# Patient Record
Sex: MALE | Race: WHITE | NOT HISPANIC OR LATINO | Employment: FULL TIME | URBAN - METROPOLITAN AREA
[De-identification: names, ages, dates, MRNs, and addresses within clinical notes are randomized per-mention and may not be internally consistent; named-entity substitution may affect disease eponyms.]

---

## 2019-06-14 ENCOUNTER — HOSPITAL ENCOUNTER (EMERGENCY)
Facility: MEDICAL CENTER | Age: 39
End: 2019-06-14
Attending: EMERGENCY MEDICINE
Payer: COMMERCIAL

## 2019-06-14 ENCOUNTER — APPOINTMENT (OUTPATIENT)
Dept: RADIOLOGY | Facility: MEDICAL CENTER | Age: 39
End: 2019-06-14
Attending: EMERGENCY MEDICINE
Payer: COMMERCIAL

## 2019-06-14 VITALS
RESPIRATION RATE: 16 BRPM | SYSTOLIC BLOOD PRESSURE: 118 MMHG | DIASTOLIC BLOOD PRESSURE: 76 MMHG | TEMPERATURE: 97.9 F | BODY MASS INDEX: 40.43 KG/M2 | HEIGHT: 74 IN | HEART RATE: 64 BPM | WEIGHT: 315 LBS | OXYGEN SATURATION: 98 %

## 2019-06-14 DIAGNOSIS — M54.32 SCIATICA OF LEFT SIDE: ICD-10-CM

## 2019-06-14 PROCEDURE — A9270 NON-COVERED ITEM OR SERVICE: HCPCS | Performed by: EMERGENCY MEDICINE

## 2019-06-14 PROCEDURE — 72100 X-RAY EXAM L-S SPINE 2/3 VWS: CPT

## 2019-06-14 PROCEDURE — 96372 THER/PROPH/DIAG INJ SC/IM: CPT

## 2019-06-14 PROCEDURE — 99284 EMERGENCY DEPT VISIT MOD MDM: CPT

## 2019-06-14 PROCEDURE — 700111 HCHG RX REV CODE 636 W/ 250 OVERRIDE (IP): Performed by: EMERGENCY MEDICINE

## 2019-06-14 PROCEDURE — 700102 HCHG RX REV CODE 250 W/ 637 OVERRIDE(OP): Performed by: EMERGENCY MEDICINE

## 2019-06-14 RX ORDER — METHYLPREDNISOLONE 4 MG/1
TABLET ORAL
Qty: 1 KIT | Refills: 0 | Status: SHIPPED | OUTPATIENT
Start: 2019-06-14 | End: 2020-10-27

## 2019-06-14 RX ORDER — CYCLOBENZAPRINE HCL 10 MG
10 TABLET ORAL 3 TIMES DAILY PRN
Qty: 30 TAB | Refills: 0 | Status: SHIPPED | OUTPATIENT
Start: 2019-06-14 | End: 2020-10-27

## 2019-06-14 RX ORDER — KETOROLAC TROMETHAMINE 30 MG/ML
30 INJECTION, SOLUTION INTRAMUSCULAR; INTRAVENOUS ONCE
Status: COMPLETED | OUTPATIENT
Start: 2019-06-14 | End: 2019-06-14

## 2019-06-14 RX ORDER — MORPHINE SULFATE 10 MG/ML
6 INJECTION, SOLUTION INTRAMUSCULAR; INTRAVENOUS ONCE
Status: COMPLETED | OUTPATIENT
Start: 2019-06-14 | End: 2019-06-14

## 2019-06-14 RX ORDER — HYDROCODONE BITARTRATE AND ACETAMINOPHEN 5; 325 MG/1; MG/1
1-2 TABLET ORAL EVERY 6 HOURS PRN
Qty: 20 TAB | Refills: 0 | Status: SHIPPED | OUTPATIENT
Start: 2019-06-14 | End: 2019-06-17

## 2019-06-14 RX ORDER — HYDROCODONE BITARTRATE AND ACETAMINOPHEN 5; 325 MG/1; MG/1
1 TABLET ORAL ONCE
Status: COMPLETED | OUTPATIENT
Start: 2019-06-14 | End: 2019-06-14

## 2019-06-14 RX ADMIN — HYDROCODONE BITARTRATE AND ACETAMINOPHEN 1 TABLET: 5; 325 TABLET ORAL at 10:20

## 2019-06-14 RX ADMIN — KETOROLAC TROMETHAMINE 30 MG: 30 INJECTION, SOLUTION INTRAMUSCULAR at 10:24

## 2019-06-14 RX ADMIN — MORPHINE SULFATE 6 MG: 10 INJECTION INTRAVENOUS at 11:01

## 2019-06-14 NOTE — ED TRIAGE NOTES
Chief Complaint   Patient presents with   • Low Back Pain     radiates down L leg, denies trauma     Ambulatory with discomfort

## 2019-06-14 NOTE — ED PROVIDER NOTES
ED Provider Note      CHIEF COMPLAINT  Chief Complaint   Patient presents with   • Low Back Pain     radiates down L leg, denies trauma       HPI  Ace Mcneill is a 39 y.o. male who presents with back pain.  Patient had some pain, but then was lifting some richy materials about 3 weeks ago.  Mild discomfort since then, but it was much worse over the last 3 days.  Sharp pain left low back.  Radiates down the back of the left thigh.  Worse with movement and cannot find a comfortable position.  Has not had fever chills injection drug use weakness numbness in the extremities.  No bowel or bladder dysfunction or saddle anesthesia.  Denies abdominal pain or urinary symptoms.  No rash    REVIEW OF SYSTEMS  Denies any weakness in the lower extremities. No bowel or bladder incontinence or saddle anesthesia. No fevers or chills. No injection drug use or IV drug abuse. No abdominal pain, nausea or vomiting. No dysuria, hematuria or flank pain.    PAST MEDICAL HISTORY  Past Medical History:   Diagnosis Date   • Gout    • Hypertension    • Renal disorder 2001    donated left kidney; one remaining   • Single kidney        FAMILY HISTORY  No family history on file.    SOCIAL HISTORY  Social History   Substance Use Topics   • Smoking status: Current Every Day Smoker   • Smokeless tobacco: Not on file      Comment: chews   • Alcohol use No       SURGICAL HISTORY  Past Surgical History:   Procedure Laterality Date   • FOOT ORIF  8/4/2009    Performed by NHUNG CHAVEZ at SURGERY [facility] ORS   • CRANIOPLASTY  1999   • PB FUSION OF KNEE  1996    right    • INGUINAL HERNIA REPAIR CHILD  1981    left        CURRENT MEDICATIONS  Home Medications     Reviewed by Willis Gay R.N. (Registered Nurse) on 06/14/19 at 0931  Med List Status: <None>   Medication Last Dose Status   ALLOPURINOL 100 MG PO TABS  Active   BusPIRone HCl (BUSPAR PO)  Active   hydrocodone-acetaminophen (NORCO) 5-325 MG Tab per tablet  Active  "  hydrocodone-acetaminophen (VICODIN) 5-500 MG TABS  Active   HYDROCODONE/APAP   MG TABS  Active   LISINOPRIL 10 MG PO TABS  Active   OXYCONTIN 20 MG TB12  Active   PRILOSEC 20 MG CPDR  Active   valsartan-hydrochlorothiazide (DIOVAN HCT) 160-12.5 MG per tablet  Active                ALLERGIES  Allergies   Allergen Reactions   • Nkda [No Known Drug Allergy]          PHYSICAL EXAM  VITAL SIGNS: BP (!) 168/105   Pulse 88   Temp 36.6 °C (97.9 °F) (Temporal)   Resp 20   Ht 1.88 m (6' 2\")   Wt (!) 143.7 kg (316 lb 12.8 oz)   SpO2 98%   BMI 40.67 kg/m²   Constitutional: Well developed, Well nourished, No acute distress, Non-toxic appearance. Complaining of pain  Neck: Grossly normal range of motion  Cardiovascular: Normal heart rate   Thorax & Lungs: No respiratory distress  Abdomen: Bowel sounds normal, soft, non-distended, nontender, no masses.  Skin: Warm, Dry, No rash.   Back: Diffuse left lower lumbar and sacral tenderness, no step-off or deformity  Extremities: No clubbing, cyanosis, edema, no Homans or cords   Neurologic: Grossly normal cranial nerves, 5 out of 5 EHL, FHL, gastrocnemius, tibialis anterior. 2+ DTRs at the patellas bilaterally. Normal gait . Normal sensory throughout.      RADIOLOGY/PROCEDURES  DX-LUMBAR SPINE-2 OR 3 VIEWS   Final Result      1.  There is no acute fracture or malalignment of the lumbar spine.   2.  There is L5-S1 degenerative disc space narrowing with endplate spurring.         imaging is interpreted by radiologist     COURSE & MEDICAL DECISION MAKING    Patient presents with lumbar back pain and radiculopathy.  Treated symptomatically with Toradol, morphine, Norco.  Had improvement of symptoms.  I obtained an x-ray because of the duration of his symptoms.  This was notable as above.  At this point patient will be treated with Medrol Dosepak, Norco, Flexeril.  Discussed expected course.  I would like the patient to follow-up with his doctor next week.  I " precautioned him to return the ER for weakness in extremities, bowel or bladder dysfunction, uncontrolled symptoms or concern.    FINAL IMPRESSION  1.  Left-sided lumbar radiculopathy        This dictation was created using voice recognition software. The accuracy of the dictation is limited to the abilities of the software.  The nursing notes were reviewed and certain aspects of this information were incorporated into this note.    Electronically signed by: Martin Ferreira, 6/14/2019 11:19 AM

## 2019-06-14 NOTE — ED NOTES
Pt verbalized understanding of D/C instructions, pt PWD, VSS, NAD, pt left walking with steady gait, home with girlfriend

## 2019-09-13 ENCOUNTER — APPOINTMENT (OUTPATIENT)
Dept: RADIOLOGY | Facility: MEDICAL CENTER | Age: 39
End: 2019-09-13
Attending: FAMILY MEDICINE
Payer: COMMERCIAL

## 2019-09-13 DIAGNOSIS — M54.32 LEFT SIDED SCIATICA: ICD-10-CM

## 2019-09-13 PROCEDURE — 72148 MRI LUMBAR SPINE W/O DYE: CPT

## 2019-12-18 ENCOUNTER — OFFICE VISIT (OUTPATIENT)
Dept: URGENT CARE | Facility: PHYSICIAN GROUP | Age: 39
End: 2019-12-18
Payer: COMMERCIAL

## 2019-12-18 VITALS
RESPIRATION RATE: 18 BRPM | HEART RATE: 86 BPM | OXYGEN SATURATION: 97 % | HEIGHT: 74 IN | TEMPERATURE: 98.7 F | WEIGHT: 315 LBS | DIASTOLIC BLOOD PRESSURE: 110 MMHG | SYSTOLIC BLOOD PRESSURE: 116 MMHG | BODY MASS INDEX: 40.43 KG/M2

## 2019-12-18 DIAGNOSIS — S60.459A FOREIGN BODY IN SKIN OF FINGER, INITIAL ENCOUNTER: ICD-10-CM

## 2019-12-18 PROCEDURE — 99213 OFFICE O/P EST LOW 20 MIN: CPT | Performed by: NURSE PRACTITIONER

## 2019-12-18 ASSESSMENT — ENCOUNTER SYMPTOMS
FOCAL WEAKNESS: 0
CHILLS: 0
WEAKNESS: 0
FEVER: 0

## 2019-12-18 NOTE — PROGRESS NOTES
Subjective:      Ace Mcneill is a 39 y.o. male who presents with Finger Injury (lodged hay in pinky nail)            Foreign Body in Skin   This is a new problem. The current episode started today. The problem occurs constantly. The problem has been unchanged. Pertinent negatives include no chills, fever, rash or weakness. Associated symptoms comments: Patient reports feeding his horses today and got a piece of hay stuck under his left fifth fingernail.  Cannot get it worked out himself. Nothing aggravates the symptoms. He has tried nothing for the symptoms.       Review of Systems   Constitutional: Negative for chills and fever.   Skin: Negative for itching and rash.   Neurological: Negative for focal weakness and weakness.     Past Medical History:   Diagnosis Date   • Gout    • Hypertension    • Renal disorder 2001    donated left kidney; one remaining   • Single kidney       Past Surgical History:   Procedure Laterality Date   • FOOT ORIF  8/4/2009    Performed by NHUNG CHAVEZ at SURGERY [facility] ORS   • CRANIOPLASTY  1999   • PB FUSION OF KNEE  1996    right    • INGUINAL HERNIA REPAIR CHILD  1981    left       Social History     Socioeconomic History   • Marital status:      Spouse name: Not on file   • Number of children: Not on file   • Years of education: Not on file   • Highest education level: Not on file   Occupational History   • Not on file   Social Needs   • Financial resource strain: Not on file   • Food insecurity:     Worry: Not on file     Inability: Not on file   • Transportation needs:     Medical: Not on file     Non-medical: Not on file   Tobacco Use   • Smoking status: Current Every Day Smoker     Packs/day: 0.00   • Smokeless tobacco: Never Used   • Tobacco comment: chews   Substance and Sexual Activity   • Alcohol use: No   • Drug use: No   • Sexual activity: Not on file   Lifestyle   • Physical activity:     Days per week: Not on file     Minutes per session: Not  "on file   • Stress: Not on file   Relationships   • Social connections:     Talks on phone: Not on file     Gets together: Not on file     Attends [employer] service: Not on file     Active member of club or organization: Not on file     Attends meetings of clubs or organizations: Not on file     Relationship status: Not on file   • Intimate partner violence:     Fear of current or ex partner: Not on file     Emotionally abused: Not on file     Physically abused: Not on file     Forced sexual activity: Not on file   Other Topics Concern   • Not on file   Social History Narrative   • Not on file    Nkda [no known drug allergy]         Objective:     /110 (BP Location: Right arm, Patient Position: Sitting, BP Cuff Size: Adult)   Pulse 86   Temp 37.1 °C (98.7 °F) (Temporal)   Resp 18   Ht 1.88 m (6' 2\")   Wt (!) 142.9 kg (315 lb)   SpO2 97%   BMI 40.44 kg/m²      Physical Exam  Vitals signs reviewed.   Constitutional:       Appearance: Normal appearance.   Skin:     General: Skin is warm.      Capillary Refill: Capillary refill takes less than 2 seconds.      Comments: Patient has FB under left 5th fingernail 1/3 way down   Neurological:      Mental Status: He is alert and oriented to person, place, and time.   Psychiatric:         Mood and Affect: Mood normal.         Behavior: Behavior normal.         Thought Content: Thought content normal.         Judgment: Judgment normal.                 Assessment/Plan:       1. Foreign body in skin of finger, initial encounter     Procedure: Foreign body removal  -Risks including bleeding, nerve damage, infection, and poor cosmetic outcome discussed. Benefits and alternatives discussed.   -Clean technique with sterile instruments used  -Local anesthesia, offered, patient refused  -FB removed after partial fingernail cut away, foreign body removed (piece of hay)  -Polysporin and dressing placed   -Patient tolerated well      Signs and symptoms of infection discussed " with patient will return to clinic if any and all are presenyt for further evaluation    Supportive care, differential diagnoses, and indications for immediate follow-up discussed with patient.    Pathogenesis of diagnosis discussed including typical length and natural progression. Patient expresses understanding and agrees to plan.       Please note that this dictation was created using voice recognition software. I have made every reasonable attempt to correct obvious errors, but I expect that there are errors of grammar and possibly content that I did not discover before finalizing the note.

## 2020-09-18 ENCOUNTER — OFFICE VISIT (OUTPATIENT)
Dept: DERMATOLOGY | Facility: IMAGING CENTER | Age: 40
End: 2020-09-18
Payer: COMMERCIAL

## 2020-09-18 VITALS — TEMPERATURE: 97.2 F | HEIGHT: 74 IN | BODY MASS INDEX: 38.5 KG/M2 | WEIGHT: 300 LBS

## 2020-09-18 DIAGNOSIS — D48.5 NEOPLASM OF UNCERTAIN BEHAVIOR OF SKIN: ICD-10-CM

## 2020-09-18 PROCEDURE — 11103 TANGNTL BX SKIN EA SEP/ADDL: CPT | Performed by: NURSE PRACTITIONER

## 2020-09-18 PROCEDURE — 11102 TANGNTL BX SKIN SINGLE LES: CPT | Performed by: NURSE PRACTITIONER

## 2020-09-18 ASSESSMENT — ENCOUNTER SYMPTOMS
DIAPHORESIS: 0
WEIGHT LOSS: 0
CHILLS: 0
FEVER: 0

## 2020-09-18 NOTE — PROGRESS NOTES
CC: Skin lesion    Subjective: New patient here for lesion on ear.  Here for lesion on ear that won't go away.     HPI/location: right ear lesion  Time present: 1 year  Painful lesion: Yes, tender to touch  Itching lesion: No  Enlarging lesion: No  Anything make it better or worse? Touching it    Possible skin tags, has 3 in right arm pit.  One on left chest    History of skin cancer: No  History of precancers/actinic keratoses: No  History of biopsies:No  History of blistering/severe sunburns:Yes, Details: as a teen  Family history of skin cancer:No  Family history of atypical moles:No    ROS: no fevers/chills. No itch.  No joint pain  DermPMH: no skin cancer/melanoma  No problem-specific Assessment & Plan notes found for this encounter.    Relevant PMH:  Social:    PE: Gen:SORINWJONY male in NAD.       A/P:    I have reviewed medications relevant to my specialty.

## 2020-09-18 NOTE — PROGRESS NOTES
Dermatology New Patient Visit    Chief Complaint   Patient presents with   • Skin Lesion       Subjective:     HPI:   Ace Mcneill is a 40 y.o. male presenting for    Subjective: New patient here for lesion on ear and irritated skin tags      HPI/location: right ear lesion  Time present: 1 year  Painful lesion: Yes, tender to touch  Itching lesion: No  Enlarging lesion: No  Anything make it better or worse? Touching it    Possible skin tags, has 3 in right arm pit and one on left chest    Large skin tag in arm pit gets caught on clothing and get pulled-tender  Large skin tag on chest gets rubbed by bullet proof vest (pt is ) and is tender at times    History of skin cancer: No  History of precancers/actinic keratoses: No  History of biopsies:No  History of blistering/severe sunburns:Yes, Details: as a teen  Family history of skin cancer:No  Family history of atypical moles:No        Past Medical History:   Diagnosis Date   • Gout    • Hyperlipidemia    • Hypertension    • Renal disorder 2001    donated left kidney; one remaining   • Single kidney        Current Outpatient Medications on File Prior to Visit   Medication Sig Dispense Refill   • valsartan-hydrochlorothiazide (DIOVAN HCT) 160-12.5 MG per tablet Take 1 Tab by mouth every day.     • ALLOPURINOL 100 MG PO TABS Take 100 mg by mouth every day.     • PRILOSEC 20 MG CPDR Take  by mouth every day.     • cyclobenzaprine (FLEXERIL) 10 MG Tab Take 1 Tab by mouth 3 times a day as needed. (Patient not taking: Reported on 12/18/2019) 30 Tab 0   • methylPREDNISolone (MEDROL DOSEPAK) 4 MG Tablet Therapy Pack Take as directed (Patient not taking: Reported on 12/18/2019) 1 Kit 0   • BusPIRone HCl (BUSPAR PO) Take  by mouth.     • OXYCONTIN 20 MG TB12 Take  by mouth as needed for Mild Pain.     • LISINOPRIL 10 MG PO TABS Take 10 mg by mouth every day.       No current facility-administered medications on file prior to visit.        Allergies      Allergen Reactions   • Nkda [No Known Drug Allergy]        History reviewed. No pertinent family history.    Social History     Socioeconomic History   • Marital status:      Spouse name: Not on file   • Number of children: Not on file   • Years of education: Not on file   • Highest education level: Not on file   Occupational History   • Not on file   Social Needs   • Financial resource strain: Not on file   • Food insecurity     Worry: Not on file     Inability: Not on file   • Transportation needs     Medical: Not on file     Non-medical: Not on file   Tobacco Use   • Smoking status: Current Every Day Smoker     Packs/day: 0.00   • Smokeless tobacco: Never Used   • Tobacco comment: chews   Substance and Sexual Activity   • Alcohol use: No   • Drug use: No   • Sexual activity: Not on file   Lifestyle   • Physical activity     Days per week: Not on file     Minutes per session: Not on file   • Stress: Not on file   Relationships   • Social connections     Talks on phone: Not on file     Gets together: Not on file     Attends Sabianist service: Not on file     Active member of club or organization: Not on file     Attends meetings of clubs or organizations: Not on file     Relationship status: Not on file   • Intimate partner violence     Fear of current or ex partner: Not on file     Emotionally abused: Not on file     Physically abused: Not on file     Forced sexual activity: Not on file   Other Topics Concern   • Not on file   Social History Narrative   • Not on file       Review of Systems   Constitutional: Negative for chills, diaphoresis, fever, malaise/fatigue and weight loss.   Skin: Negative for itching and rash.        Objective:     A focused cutaneous exam was completed including: ears and face above mask, chest and right axilla with the following pertinent findings listed below. Remaining above-listed examined areas within normal limits / negative for rashes or lesions.      Temp 36.2 °C (97.2  "°F)   Ht 1.88 m (6' 2\")   Wt (!) 136.1 kg (300 lb)     Physical Exam   Constitutional: He is oriented to person, place, and time and well-developed, well-nourished, and in no distress. No distress.   HENT:   Head: Normocephalic.       Pulmonary/Chest: Effort normal.   Neurological: He is alert and oriented to person, place, and time.   Skin: Skin is warm and dry. No rash noted. No erythema.        Psychiatric: Mood normal.       DATA: none applicable to review    Assessment and Plan:     1. Neoplasm of uncertain behavior of skinx3  Procedure Note   Procedure: Biopsy by shave technique  Location: right ear helix  Size: as noted in exam  Preoperative diagnosis:Ak, BCC, SCC  Risks, benefits and alternatives of procedure discussed and written informed consent obtained for procedure and verbal consent for photos. Time out completed. Area of biopsy prepped with alcohol. Anesthesia with 1% lidocaine with epinephrine administered with 30 gauge needle. Shave biopsy of the site performed. Hemostasis achieved with pressure and aluminum chloride. Vaseline applied to wound with bandage. Patient tolerated procedure well and there were no complications. The specimen was sent to the pathology lab by the staff. Wound care was discussed.    Procedure Note   Procedure: Biopsy by shave technique  Location: right axilla  Size: as noted in exam  Preoperative diagnosis:acrochordon?  Risks, benefits and alternatives of procedure discussed and written informed consent obtained for procedure and verbal consent for photos. Time out completed. Area of biopsy prepped with alcohol. Anesthesia with 1% lidocaine with epinephrine administered with 30 gauge needle. Shave biopsy of the site performed. Hemostasis achieved with pressure and aluminum chloride. Vaseline applied to wound with bandage. Patient tolerated procedure well and there were no complications. The specimen was sent to the pathology lab by the staff. Wound care was " discussed.    Procedure Note   Procedure: Biopsy by shave technique  Location: left upper chest  Size: as noted in exam  Preoperative diagnosis:achrocordon?  Risks, benefits and alternatives of procedure discussed and written informed consent obtained for procedure and verbal consent for photos. Time out completed. Area of biopsy prepped with alcohol. Anesthesia with 1% lidocaine with epinephrine administered with 30 gauge needle. Shave biopsy of the site performed. Hemostasis achieved with pressure and aluminum chloride. Vaseline applied to wound with bandage. Patient tolerated procedure well and there were no complications. The specimen was sent to the pathology lab by the staff. Wound care was discussed.        Followup: Return for pending biopsy results.    VANDA Sigala.

## 2020-09-24 ENCOUNTER — TELEPHONE (OUTPATIENT)
Dept: DERMATOLOGY | Facility: IMAGING CENTER | Age: 40
End: 2020-09-24

## 2020-10-27 ENCOUNTER — OFFICE VISIT (OUTPATIENT)
Dept: URGENT CARE | Facility: PHYSICIAN GROUP | Age: 40
End: 2020-10-27
Payer: COMMERCIAL

## 2020-10-27 ENCOUNTER — HOSPITAL ENCOUNTER (OUTPATIENT)
Facility: MEDICAL CENTER | Age: 40
End: 2020-10-27
Attending: FAMILY MEDICINE
Payer: COMMERCIAL

## 2020-10-27 VITALS
DIASTOLIC BLOOD PRESSURE: 82 MMHG | OXYGEN SATURATION: 96 % | WEIGHT: 315 LBS | HEART RATE: 109 BPM | TEMPERATURE: 97.4 F | HEIGHT: 74 IN | BODY MASS INDEX: 40.43 KG/M2 | SYSTOLIC BLOOD PRESSURE: 142 MMHG | RESPIRATION RATE: 18 BRPM

## 2020-10-27 DIAGNOSIS — R68.83 CHILLS: ICD-10-CM

## 2020-10-27 DIAGNOSIS — Z20.822 EXPOSURE TO COVID-19 VIRUS: ICD-10-CM

## 2020-10-27 DIAGNOSIS — M25.471 PAIN AND SWELLING OF RIGHT ANKLE: ICD-10-CM

## 2020-10-27 DIAGNOSIS — M25.571 PAIN AND SWELLING OF RIGHT ANKLE: ICD-10-CM

## 2020-10-27 LAB — COVID ORDER STATUS COVID19: NORMAL

## 2020-10-27 PROCEDURE — U0003 INFECTIOUS AGENT DETECTION BY NUCLEIC ACID (DNA OR RNA); SEVERE ACUTE RESPIRATORY SYNDROME CORONAVIRUS 2 (SARS-COV-2) (CORONAVIRUS DISEASE [COVID-19]), AMPLIFIED PROBE TECHNIQUE, MAKING USE OF HIGH THROUGHPUT TECHNOLOGIES AS DESCRIBED BY CMS-2020-01-R: HCPCS

## 2020-10-27 PROCEDURE — 99214 OFFICE O/P EST MOD 30 MIN: CPT | Mod: CS | Performed by: FAMILY MEDICINE

## 2020-10-27 RX ORDER — METHYLPREDNISOLONE 4 MG/1
TABLET ORAL
Qty: 21 TAB | Refills: 0 | Status: SHIPPED | OUTPATIENT
Start: 2020-10-27 | End: 2020-11-02

## 2020-10-27 NOTE — PROGRESS NOTES
Chief Complaint:    Chief Complaint   Patient presents with   • Ankle Pain     bilat ankle pain and swelling-no known cause of ankle pain   • Chills       History of Present Illness:    This is a new problem. 3 days ago, he started with some left ankle pain and swelling for no particular reason which has now resolved, but next day, his right ankle started with pain and swelling for no particular reason. He has history of gout and takes Allopurinol but has not had gout attack in many years. He takes Allopurinol. He has been doing some moving and has been on feet a lot recently. 2 days ago, he started with some chills, but no definite fever. No URI type of symptoms. Has one kidney and Creatinine is checked yearly, usually is 1.6 range, so he tries to avoid NSAIDs. Medrol Dose Sánchez works well and is tolerated.      Review of Systems:    Constitutional: See HPI.   Eyes: Negative for change in vision, photophobia, pain, redness, and discharge.  ENT: Negative for ear pain, ear discharge, hearing loss, tinnitus, nasal congestion, nosebleeds, and sore throat.    Respiratory: Negative for cough, hemoptysis, sputum production, shortness of breath, wheezing, and stridor.    Cardiovascular: Negative for chest pain, palpitations, orthopnea, claudication, leg swelling, and PND.   Gastrointestinal: Negative for abdominal pain, nausea, vomiting, diarrhea, constipation, blood in stool, and melena.   Genitourinary: Negative for dysuria, urinary urgency, urinary frequency, hematuria, and flank pain.   Musculoskeletal: See HPI.  Skin: Negative for rash and itching.   Neurological: Negative for dizziness, tingling, tremors, sensory change, speech change, focal weakness, seizures, loss of consciousness, and headaches.   Endo: Negative for polydipsia.   Heme: Does not bruise/bleed easily.   Psychiatric/Behavioral: Negative for depression, suicidal ideas, hallucinations, memory loss and substance abuse. The patient is not nervous/anxious  and does not have insomnia.        Past Medical History:    Past Medical History:   Diagnosis Date   • Gout    • Hyperlipidemia    • Hypertension    • Renal disorder 2001    donated left kidney; one remaining   • Single kidney      Past Surgical History:    Past Surgical History:   Procedure Laterality Date   • FOOT ORIF  8/4/2009    Performed by NHUNG CHAVEZ at SURGERY [facility] ORS   • CRANIOPLASTY  1999   • PB FUSION OF KNEE  1996    right    • INGUINAL HERNIA REPAIR CHILD  1981    left      Social History:    Social History     Socioeconomic History   • Marital status:      Spouse name: Not on file   • Number of children: Not on file   • Years of education: Not on file   • Highest education level: Not on file   Occupational History   • Not on file   Social Needs   • Financial resource strain: Not on file   • Food insecurity     Worry: Not on file     Inability: Not on file   • Transportation needs     Medical: Not on file     Non-medical: Not on file   Tobacco Use   • Smoking status: Current Every Day Smoker     Packs/day: 0.00   • Smokeless tobacco: Never Used   • Tobacco comment: chews   Substance and Sexual Activity   • Alcohol use: No   • Drug use: No   • Sexual activity: Not on file   Lifestyle   • Physical activity     Days per week: Not on file     Minutes per session: Not on file   • Stress: Not on file   Relationships   • Social connections     Talks on phone: Not on file     Gets together: Not on file     Attends Hoahaoism service: Not on file     Active member of club or organization: Not on file     Attends meetings of clubs or organizations: Not on file     Relationship status: Not on file   • Intimate partner violence     Fear of current or ex partner: Not on file     Emotionally abused: Not on file     Physically abused: Not on file     Forced sexual activity: Not on file   Other Topics Concern   • Not on file   Social History Narrative   • Not on file     Family History:    No  "family history on file.    Medications:    Current Outpatient Medications on File Prior to Visit   Medication Sig Dispense Refill   • ALLOPURINOL 100 MG PO TABS Take 100 mg by mouth every day.     • PRILOSEC 20 MG CPDR Take  by mouth every day.       No current facility-administered medications on file prior to visit.      Allergies:    Allergies   Allergen Reactions   • Lisinopril Cough       Vitals:    Vitals:    10/27/20 1157   BP: 142/82   Pulse: (!) 109   Resp: 18   Temp: 36.3 °C (97.4 °F)   SpO2: 96%   Weight: (!) 153.4 kg (338 lb 3.2 oz)   Height: 1.88 m (6' 2\")       Physical Exam:    Constitutional: Vital signs reviewed. Appears well-developed and well-nourished. No acute distress.   Eyes: Sclera white, conjunctivae clear.  ENT: External ears normal. Hearing normal.  Cardiovascular: Peripheral pulses 2+.   Pulmonary/Chest: Respirations non-labored.  Musculoskeletal: Right ankle and proximal right foot has mild-moderate diffuse swelling and mild erythema dorsum of proximal aspect of right foot. Tender in right ankle with flexion and internal rotation. Left ankle is normal.  Neurological: Alert and oriented to person, place, and time. Muscle tone normal. Coordination normal. Light touch and sensation normal.  Skin: See above.  Psychiatric: Normal mood and affect. Behavior is normal. Judgment and thought content normal.       Assessment / Plan:    1. Pain and swelling of right ankle  - methylPREDNISolone (MEDROL DOSEPAK) 4 MG Tablet Therapy Pack; TAKE AS DIRECTED ON PACKAGE.  Dispense: 21 Tab; Refill: 0    2. Chills  - COVID/SARS COV-2 PCR; Future    3. Exposure to COVID-19 virus  - COVID/SARS COV-2 PCR; Future      Discussed with him DDX, management options, and risks, benefits, and alternatives to treatment plan agreed upon.    Likely MSK inflammation as cause of right ankle symptoms.     Rec'd relative rest.    Agreeable to medication prescribed for anti-inflammatory effect.    Agreeable to COVID-19 test " obtained due to chills x 2 days.    Advised test result will show in MyChart.    Patient will follow-up if needed while waiting for test result.

## 2020-10-28 LAB
SARS-COV-2 RNA RESP QL NAA+PROBE: NOTDETECTED
SPECIMEN SOURCE: NORMAL

## 2021-08-22 ENCOUNTER — HOSPITAL ENCOUNTER (INPATIENT)
Facility: MEDICAL CENTER | Age: 41
LOS: 2 days | DRG: 638 | End: 2021-08-24
Attending: EMERGENCY MEDICINE | Admitting: HOSPITALIST
Payer: COMMERCIAL

## 2021-08-22 DIAGNOSIS — E11.9 NEW ONSET TYPE 2 DIABETES MELLITUS (HCC): ICD-10-CM

## 2021-08-22 DIAGNOSIS — E11.65 UNCONTROLLED TYPE 2 DIABETES MELLITUS WITH HYPERGLYCEMIA (HCC): ICD-10-CM

## 2021-08-22 DIAGNOSIS — N28.9 RENAL INSUFFICIENCY: ICD-10-CM

## 2021-08-22 PROBLEM — E87.1 HYPONATREMIA: Status: ACTIVE | Noted: 2021-08-22

## 2021-08-22 PROBLEM — M10.9 GOUT: Status: ACTIVE | Noted: 2021-08-22

## 2021-08-22 PROBLEM — R73.9 HYPERGLYCEMIA: Status: ACTIVE | Noted: 2021-08-22

## 2021-08-22 PROBLEM — H53.8 BLURRY VISION: Status: ACTIVE | Noted: 2021-08-22

## 2021-08-22 PROBLEM — I10 ESSENTIAL HYPERTENSION: Status: ACTIVE | Noted: 2021-08-22

## 2021-08-22 PROBLEM — K21.9 GERD (GASTROESOPHAGEAL REFLUX DISEASE): Status: ACTIVE | Noted: 2021-08-22

## 2021-08-22 PROBLEM — E86.0 DEHYDRATION: Status: ACTIVE | Noted: 2021-08-22

## 2021-08-22 LAB
ALBUMIN SERPL BCP-MCNC: 3.9 G/DL (ref 3.2–4.9)
ALBUMIN/GLOB SERPL: 1.7 G/DL
ALP SERPL-CCNC: 102 U/L (ref 30–99)
ALT SERPL-CCNC: 39 U/L (ref 2–50)
ANION GAP SERPL CALC-SCNC: 12 MMOL/L (ref 7–16)
APPEARANCE UR: CLEAR
AST SERPL-CCNC: 23 U/L (ref 12–45)
BASOPHILS # BLD AUTO: 0.6 % (ref 0–1.8)
BASOPHILS # BLD: 0.07 K/UL (ref 0–0.12)
BILIRUB SERPL-MCNC: 1.1 MG/DL (ref 0.1–1.5)
BILIRUB UR QL STRIP.AUTO: NEGATIVE
BUN SERPL-MCNC: 20 MG/DL (ref 8–22)
CALCIUM SERPL-MCNC: 9.7 MG/DL (ref 8.4–10.2)
CHLORIDE SERPL-SCNC: 81 MMOL/L (ref 96–112)
CO2 SERPL-SCNC: 22 MMOL/L (ref 20–33)
COLOR UR: YELLOW
CREAT SERPL-MCNC: 1.76 MG/DL (ref 0.5–1.4)
EOSINOPHIL # BLD AUTO: 0.12 K/UL (ref 0–0.51)
EOSINOPHIL NFR BLD: 1.1 % (ref 0–6.9)
ERYTHROCYTE [DISTWIDTH] IN BLOOD BY AUTOMATED COUNT: 34.7 FL (ref 35.9–50)
GLOBULIN SER CALC-MCNC: 2.3 G/DL (ref 1.9–3.5)
GLUCOSE BLD-MCNC: >600 MG/DL (ref 65–99)
GLUCOSE BLD-MCNC: >600 MG/DL (ref 65–99)
GLUCOSE SERPL-MCNC: 811 MG/DL (ref 65–99)
GLUCOSE SERPL-MCNC: 982 MG/DL (ref 65–99)
GLUCOSE UR STRIP.AUTO-MCNC: >=1000 MG/DL
HCT VFR BLD AUTO: 45.3 % (ref 42–52)
HGB BLD-MCNC: 17 G/DL (ref 14–18)
IMM GRANULOCYTES # BLD AUTO: 0.06 K/UL (ref 0–0.11)
IMM GRANULOCYTES NFR BLD AUTO: 0.5 % (ref 0–0.9)
KETONES UR STRIP.AUTO-MCNC: NEGATIVE MG/DL
LEUKOCYTE ESTERASE UR QL STRIP.AUTO: NEGATIVE
LYMPHOCYTES # BLD AUTO: 2.47 K/UL (ref 1–4.8)
LYMPHOCYTES NFR BLD: 22.4 % (ref 22–41)
MCH RBC QN AUTO: 30.6 PG (ref 27–33)
MCHC RBC AUTO-ENTMCNC: 37.5 G/DL (ref 33.7–35.3)
MCV RBC AUTO: 81.6 FL (ref 81.4–97.8)
MICRO URNS: ABNORMAL
MONOCYTES # BLD AUTO: 0.74 K/UL (ref 0–0.85)
MONOCYTES NFR BLD AUTO: 6.7 % (ref 0–13.4)
NEUTROPHILS # BLD AUTO: 7.55 K/UL (ref 1.82–7.42)
NEUTROPHILS NFR BLD: 68.7 % (ref 44–72)
NITRITE UR QL STRIP.AUTO: NEGATIVE
NRBC # BLD AUTO: 0 K/UL
NRBC BLD-RTO: 0 /100 WBC
PH UR STRIP.AUTO: 6 [PH] (ref 5–8)
PLATELET # BLD AUTO: 280 K/UL (ref 164–446)
PMV BLD AUTO: 10.9 FL (ref 9–12.9)
POTASSIUM SERPL-SCNC: 4.4 MMOL/L (ref 3.6–5.5)
PROT SERPL-MCNC: 6.2 G/DL (ref 6–8.2)
PROT UR QL STRIP: NEGATIVE MG/DL
RBC # BLD AUTO: 5.55 M/UL (ref 4.7–6.1)
RBC UR QL AUTO: NEGATIVE
SODIUM SERPL-SCNC: 115 MMOL/L (ref 135–145)
SP GR UR STRIP.AUTO: <=1.005
WBC # BLD AUTO: 11 K/UL (ref 4.8–10.8)

## 2021-08-22 PROCEDURE — 82962 GLUCOSE BLOOD TEST: CPT

## 2021-08-22 PROCEDURE — 770006 HCHG ROOM/CARE - MED/SURG/GYN SEMI*

## 2021-08-22 PROCEDURE — 85025 COMPLETE CBC W/AUTO DIFF WBC: CPT

## 2021-08-22 PROCEDURE — 96372 THER/PROPH/DIAG INJ SC/IM: CPT

## 2021-08-22 PROCEDURE — 700105 HCHG RX REV CODE 258: Performed by: EMERGENCY MEDICINE

## 2021-08-22 PROCEDURE — 83036 HEMOGLOBIN GLYCOSYLATED A1C: CPT

## 2021-08-22 PROCEDURE — 99285 EMERGENCY DEPT VISIT HI MDM: CPT

## 2021-08-22 PROCEDURE — 81003 URINALYSIS AUTO W/O SCOPE: CPT

## 2021-08-22 PROCEDURE — 700102 HCHG RX REV CODE 250 W/ 637 OVERRIDE(OP): Performed by: EMERGENCY MEDICINE

## 2021-08-22 PROCEDURE — 99223 1ST HOSP IP/OBS HIGH 75: CPT | Mod: AI | Performed by: HOSPITALIST

## 2021-08-22 PROCEDURE — 36415 COLL VENOUS BLD VENIPUNCTURE: CPT

## 2021-08-22 PROCEDURE — A9270 NON-COVERED ITEM OR SERVICE: HCPCS | Performed by: EMERGENCY MEDICINE

## 2021-08-22 PROCEDURE — 700102 HCHG RX REV CODE 250 W/ 637 OVERRIDE(OP): Performed by: HOSPITALIST

## 2021-08-22 PROCEDURE — 80053 COMPREHEN METABOLIC PANEL: CPT

## 2021-08-22 PROCEDURE — 82947 ASSAY GLUCOSE BLOOD QUANT: CPT

## 2021-08-22 RX ORDER — AMOXICILLIN 250 MG
2 CAPSULE ORAL 2 TIMES DAILY
Status: DISCONTINUED | OUTPATIENT
Start: 2021-08-22 | End: 2021-08-24 | Stop reason: HOSPADM

## 2021-08-22 RX ORDER — PROMETHAZINE HYDROCHLORIDE 25 MG/1
12.5-25 TABLET ORAL EVERY 4 HOURS PRN
Status: DISCONTINUED | OUTPATIENT
Start: 2021-08-22 | End: 2021-08-24 | Stop reason: HOSPADM

## 2021-08-22 RX ORDER — SODIUM CHLORIDE 9 MG/ML
1000 INJECTION, SOLUTION INTRAVENOUS ONCE
Status: COMPLETED | OUTPATIENT
Start: 2021-08-22 | End: 2021-08-22

## 2021-08-22 RX ORDER — ALLOPURINOL 100 MG/1
100 TABLET ORAL EVERY EVENING
Status: DISCONTINUED | OUTPATIENT
Start: 2021-08-23 | End: 2021-08-24 | Stop reason: HOSPADM

## 2021-08-22 RX ORDER — DEXTROSE MONOHYDRATE 25 G/50ML
50 INJECTION, SOLUTION INTRAVENOUS
Status: DISCONTINUED | OUTPATIENT
Start: 2021-08-22 | End: 2021-08-23

## 2021-08-22 RX ORDER — ONDANSETRON 2 MG/ML
4 INJECTION INTRAMUSCULAR; INTRAVENOUS EVERY 4 HOURS PRN
Status: DISCONTINUED | OUTPATIENT
Start: 2021-08-22 | End: 2021-08-24 | Stop reason: HOSPADM

## 2021-08-22 RX ORDER — INSULIN LISPRO 100 [IU]/ML
5 INJECTION, SOLUTION INTRAVENOUS; SUBCUTANEOUS
Status: DISCONTINUED | OUTPATIENT
Start: 2021-08-23 | End: 2021-08-23

## 2021-08-22 RX ORDER — LABETALOL HYDROCHLORIDE 5 MG/ML
10 INJECTION, SOLUTION INTRAVENOUS EVERY 4 HOURS PRN
Status: DISCONTINUED | OUTPATIENT
Start: 2021-08-22 | End: 2021-08-24 | Stop reason: HOSPADM

## 2021-08-22 RX ORDER — ONDANSETRON 4 MG/1
4 TABLET, ORALLY DISINTEGRATING ORAL EVERY 4 HOURS PRN
Status: DISCONTINUED | OUTPATIENT
Start: 2021-08-22 | End: 2021-08-24 | Stop reason: HOSPADM

## 2021-08-22 RX ORDER — CLONIDINE HYDROCHLORIDE 0.1 MG/1
0.1 TABLET ORAL EVERY 6 HOURS PRN
Status: DISCONTINUED | OUTPATIENT
Start: 2021-08-22 | End: 2021-08-24 | Stop reason: HOSPADM

## 2021-08-22 RX ORDER — PROMETHAZINE HYDROCHLORIDE 25 MG/1
12.5-25 SUPPOSITORY RECTAL EVERY 4 HOURS PRN
Status: DISCONTINUED | OUTPATIENT
Start: 2021-08-22 | End: 2021-08-24 | Stop reason: HOSPADM

## 2021-08-22 RX ORDER — POLYETHYLENE GLYCOL 3350 17 G/17G
1 POWDER, FOR SOLUTION ORAL
Status: DISCONTINUED | OUTPATIENT
Start: 2021-08-22 | End: 2021-08-24 | Stop reason: HOSPADM

## 2021-08-22 RX ORDER — OMEPRAZOLE 20 MG/1
20 CAPSULE, DELAYED RELEASE ORAL DAILY
Status: DISCONTINUED | OUTPATIENT
Start: 2021-08-23 | End: 2021-08-24 | Stop reason: HOSPADM

## 2021-08-22 RX ORDER — ACETAMINOPHEN 325 MG/1
650 TABLET ORAL EVERY 6 HOURS PRN
Status: DISCONTINUED | OUTPATIENT
Start: 2021-08-22 | End: 2021-08-24 | Stop reason: HOSPADM

## 2021-08-22 RX ORDER — BISACODYL 10 MG
10 SUPPOSITORY, RECTAL RECTAL
Status: DISCONTINUED | OUTPATIENT
Start: 2021-08-22 | End: 2021-08-24 | Stop reason: HOSPADM

## 2021-08-22 RX ORDER — PROCHLORPERAZINE EDISYLATE 5 MG/ML
5-10 INJECTION INTRAMUSCULAR; INTRAVENOUS EVERY 4 HOURS PRN
Status: DISCONTINUED | OUTPATIENT
Start: 2021-08-22 | End: 2021-08-24 | Stop reason: HOSPADM

## 2021-08-22 RX ORDER — INSULIN LISPRO 100 [IU]/ML
1-6 INJECTION, SOLUTION INTRAVENOUS; SUBCUTANEOUS
Status: DISCONTINUED | OUTPATIENT
Start: 2021-08-23 | End: 2021-08-23

## 2021-08-22 RX ORDER — SODIUM CHLORIDE 9 MG/ML
INJECTION, SOLUTION INTRAVENOUS CONTINUOUS
Status: DISCONTINUED | OUTPATIENT
Start: 2021-08-22 | End: 2021-08-24 | Stop reason: HOSPADM

## 2021-08-22 RX ADMIN — METFORMIN HYDROCHLORIDE 500 MG: 500 TABLET ORAL at 20:54

## 2021-08-22 RX ADMIN — SODIUM CHLORIDE 1000 ML: 9 INJECTION, SOLUTION INTRAVENOUS at 19:56

## 2021-08-22 RX ADMIN — INSULIN HUMAN 10 UNITS: 100 INJECTION, SOLUTION PARENTERAL at 20:51

## 2021-08-22 ASSESSMENT — ENCOUNTER SYMPTOMS
EYE REDNESS: 0
FOCAL WEAKNESS: 0
EYE DISCHARGE: 0
FLANK PAIN: 0
POLYDIPSIA: 1
NERVOUS/ANXIOUS: 0
CHILLS: 0
BRUISES/BLEEDS EASILY: 0
VOMITING: 0
FEVER: 0
COUGH: 0
SHORTNESS OF BREATH: 0
STRIDOR: 0
MYALGIAS: 0
ABDOMINAL PAIN: 0

## 2021-08-23 LAB
ALBUMIN SERPL BCP-MCNC: 3.4 G/DL (ref 3.2–4.9)
ALBUMIN/GLOB SERPL: 1.6 G/DL
ALP SERPL-CCNC: 88 U/L (ref 30–99)
ALT SERPL-CCNC: 33 U/L (ref 2–50)
ANION GAP SERPL CALC-SCNC: 12 MMOL/L (ref 7–16)
AST SERPL-CCNC: 18 U/L (ref 12–45)
BASOPHILS # BLD AUTO: 0.8 % (ref 0–1.8)
BASOPHILS # BLD: 0.09 K/UL (ref 0–0.12)
BILIRUB SERPL-MCNC: 0.6 MG/DL (ref 0.1–1.5)
BUN SERPL-MCNC: 21 MG/DL (ref 8–22)
CALCIUM SERPL-MCNC: 9.3 MG/DL (ref 8.4–10.2)
CHLORIDE SERPL-SCNC: 90 MMOL/L (ref 96–112)
CO2 SERPL-SCNC: 24 MMOL/L (ref 20–33)
CREAT SERPL-MCNC: 1.59 MG/DL (ref 0.5–1.4)
EOSINOPHIL # BLD AUTO: 0.26 K/UL (ref 0–0.51)
EOSINOPHIL NFR BLD: 2.5 % (ref 0–6.9)
ERYTHROCYTE [DISTWIDTH] IN BLOOD BY AUTOMATED COUNT: 36 FL (ref 35.9–50)
EST. AVERAGE GLUCOSE BLD GHB EST-MCNC: 295 MG/DL
GLOBULIN SER CALC-MCNC: 2.1 G/DL (ref 1.9–3.5)
GLUCOSE BLD-MCNC: 376 MG/DL (ref 65–99)
GLUCOSE BLD-MCNC: 419 MG/DL (ref 65–99)
GLUCOSE BLD-MCNC: 428 MG/DL (ref 65–99)
GLUCOSE BLD-MCNC: 538 MG/DL (ref 65–99)
GLUCOSE SERPL-MCNC: 559 MG/DL (ref 65–99)
HBA1C MFR BLD: 11.9 % (ref 4–5.6)
HCT VFR BLD AUTO: 43.6 % (ref 42–52)
HGB BLD-MCNC: 15.7 G/DL (ref 14–18)
IMM GRANULOCYTES # BLD AUTO: 0.06 K/UL (ref 0–0.11)
IMM GRANULOCYTES NFR BLD AUTO: 0.6 % (ref 0–0.9)
LYMPHOCYTES # BLD AUTO: 3.59 K/UL (ref 1–4.8)
LYMPHOCYTES NFR BLD: 33.8 % (ref 22–41)
MAGNESIUM SERPL-MCNC: 1.9 MG/DL (ref 1.5–2.5)
MCH RBC QN AUTO: 30.3 PG (ref 27–33)
MCHC RBC AUTO-ENTMCNC: 36 G/DL (ref 33.7–35.3)
MCV RBC AUTO: 84.2 FL (ref 81.4–97.8)
MONOCYTES # BLD AUTO: 0.77 K/UL (ref 0–0.85)
MONOCYTES NFR BLD AUTO: 7.3 % (ref 0–13.4)
NEUTROPHILS # BLD AUTO: 5.84 K/UL (ref 1.82–7.42)
NEUTROPHILS NFR BLD: 55 % (ref 44–72)
NRBC # BLD AUTO: 0 K/UL
NRBC BLD-RTO: 0 /100 WBC
PLATELET # BLD AUTO: 236 K/UL (ref 164–446)
PMV BLD AUTO: 10.9 FL (ref 9–12.9)
POTASSIUM SERPL-SCNC: 4 MMOL/L (ref 3.6–5.5)
PROT SERPL-MCNC: 5.5 G/DL (ref 6–8.2)
RBC # BLD AUTO: 5.18 M/UL (ref 4.7–6.1)
SODIUM SERPL-SCNC: 126 MMOL/L (ref 135–145)
WBC # BLD AUTO: 10.6 K/UL (ref 4.8–10.8)

## 2021-08-23 PROCEDURE — 700105 HCHG RX REV CODE 258: Performed by: HOSPITALIST

## 2021-08-23 PROCEDURE — 99233 SBSQ HOSP IP/OBS HIGH 50: CPT | Performed by: FAMILY MEDICINE

## 2021-08-23 PROCEDURE — 36415 COLL VENOUS BLD VENIPUNCTURE: CPT

## 2021-08-23 PROCEDURE — 83735 ASSAY OF MAGNESIUM: CPT

## 2021-08-23 PROCEDURE — 94760 N-INVAS EAR/PLS OXIMETRY 1: CPT

## 2021-08-23 PROCEDURE — 770006 HCHG ROOM/CARE - MED/SURG/GYN SEMI*

## 2021-08-23 PROCEDURE — A9270 NON-COVERED ITEM OR SERVICE: HCPCS | Performed by: HOSPITALIST

## 2021-08-23 PROCEDURE — 700105 HCHG RX REV CODE 258: Performed by: FAMILY MEDICINE

## 2021-08-23 PROCEDURE — 85025 COMPLETE CBC W/AUTO DIFF WBC: CPT

## 2021-08-23 PROCEDURE — 82962 GLUCOSE BLOOD TEST: CPT | Mod: 91

## 2021-08-23 PROCEDURE — 700102 HCHG RX REV CODE 250 W/ 637 OVERRIDE(OP): Performed by: HOSPITALIST

## 2021-08-23 PROCEDURE — 80053 COMPREHEN METABOLIC PANEL: CPT

## 2021-08-23 RX ORDER — INSULIN LISPRO 100 [IU]/ML
5 INJECTION, SOLUTION INTRAVENOUS; SUBCUTANEOUS
Status: DISCONTINUED | OUTPATIENT
Start: 2021-08-23 | End: 2021-08-24

## 2021-08-23 RX ORDER — VALSARTAN 80 MG/1
80 TABLET ORAL DAILY
COMMUNITY
End: 2022-07-06

## 2021-08-23 RX ORDER — INSULIN LISPRO 100 [IU]/ML
1-6 INJECTION, SOLUTION INTRAVENOUS; SUBCUTANEOUS
Status: DISCONTINUED | OUTPATIENT
Start: 2021-08-23 | End: 2021-08-24

## 2021-08-23 RX ORDER — DEXTROSE MONOHYDRATE 25 G/50ML
50 INJECTION, SOLUTION INTRAVENOUS
Status: DISCONTINUED | OUTPATIENT
Start: 2021-08-23 | End: 2021-08-24

## 2021-08-23 RX ADMIN — INSULIN LISPRO 6 UNITS: 100 INJECTION, SOLUTION INTRAVENOUS; SUBCUTANEOUS at 06:47

## 2021-08-23 RX ADMIN — INSULIN LISPRO 6 UNITS: 100 INJECTION, SOLUTION INTRAVENOUS; SUBCUTANEOUS at 17:23

## 2021-08-23 RX ADMIN — INSULIN LISPRO 6 UNITS: 100 INJECTION, SOLUTION INTRAVENOUS; SUBCUTANEOUS at 11:01

## 2021-08-23 RX ADMIN — SODIUM CHLORIDE: 9 INJECTION, SOLUTION INTRAVENOUS at 00:03

## 2021-08-23 RX ADMIN — INSULIN LISPRO 5 UNITS: 100 INJECTION, SOLUTION INTRAVENOUS; SUBCUTANEOUS at 06:51

## 2021-08-23 RX ADMIN — INSULIN LISPRO 5 UNITS: 100 INJECTION, SOLUTION INTRAVENOUS; SUBCUTANEOUS at 21:05

## 2021-08-23 RX ADMIN — SODIUM CHLORIDE: 9 INJECTION, SOLUTION INTRAVENOUS at 19:37

## 2021-08-23 RX ADMIN — SODIUM CHLORIDE: 9 INJECTION, SOLUTION INTRAVENOUS at 10:55

## 2021-08-23 RX ADMIN — INSULIN LISPRO 5 UNITS: 100 INJECTION, SOLUTION INTRAVENOUS; SUBCUTANEOUS at 11:02

## 2021-08-23 RX ADMIN — INSULIN LISPRO 5 UNITS: 100 INJECTION, SOLUTION INTRAVENOUS; SUBCUTANEOUS at 17:26

## 2021-08-23 RX ADMIN — ALLOPURINOL 100 MG: 100 TABLET ORAL at 17:29

## 2021-08-23 RX ADMIN — INSULIN GLARGINE 5 UNITS: 100 INJECTION, SOLUTION SUBCUTANEOUS at 00:03

## 2021-08-23 RX ADMIN — OMEPRAZOLE 20 MG: 20 CAPSULE, DELAYED RELEASE ORAL at 06:47

## 2021-08-23 ASSESSMENT — COGNITIVE AND FUNCTIONAL STATUS - GENERAL
DAILY ACTIVITIY SCORE: 24
MOBILITY SCORE: 24
SUGGESTED CMS G CODE MODIFIER DAILY ACTIVITY: CH
SUGGESTED CMS G CODE MODIFIER MOBILITY: CH

## 2021-08-23 ASSESSMENT — ENCOUNTER SYMPTOMS
ABDOMINAL PAIN: 0
DIZZINESS: 0
SHORTNESS OF BREATH: 0
MYALGIAS: 0
COUGH: 0
VOMITING: 0
NAUSEA: 0
CHILLS: 0
FEVER: 0

## 2021-08-23 ASSESSMENT — PAIN DESCRIPTION - PAIN TYPE
TYPE: ACUTE PAIN

## 2021-08-23 ASSESSMENT — LIFESTYLE VARIABLES
HAVE PEOPLE ANNOYED YOU BY CRITICIZING YOUR DRINKING: NO
HOW MANY TIMES IN THE PAST YEAR HAVE YOU HAD 5 OR MORE DRINKS IN A DAY: 0
EVER HAD A DRINK FIRST THING IN THE MORNING TO STEADY YOUR NERVES TO GET RID OF A HANGOVER: NO
AVERAGE NUMBER OF DAYS PER WEEK YOU HAVE A DRINK CONTAINING ALCOHOL: 0
CONSUMPTION TOTAL: NEGATIVE
ALCOHOL_USE: YES
TOTAL SCORE: 0
EVER FELT BAD OR GUILTY ABOUT YOUR DRINKING: NO
HAVE YOU EVER FELT YOU SHOULD CUT DOWN ON YOUR DRINKING: NO
TOTAL SCORE: 0
ON A TYPICAL DAY WHEN YOU DRINK ALCOHOL HOW MANY DRINKS DO YOU HAVE: 0
TOTAL SCORE: 0

## 2021-08-23 ASSESSMENT — PATIENT HEALTH QUESTIONNAIRE - PHQ9
1. LITTLE INTEREST OR PLEASURE IN DOING THINGS: NOT AT ALL
SUM OF ALL RESPONSES TO PHQ9 QUESTIONS 1 AND 2: 0
2. FEELING DOWN, DEPRESSED, IRRITABLE, OR HOPELESS: NOT AT ALL

## 2021-08-23 NOTE — PROGRESS NOTES
Received report from night shift RN. Patient A&Ox4, denies pain N/V, SOB at this time. CMS intact. Vitals reviewed, chart reviewed. Bed in lowest, locked position. Safety precautions in place. Call light and belongings in reach. Patient denies further needs at this time.

## 2021-08-23 NOTE — ASSESSMENT & PLAN NOTE
Extremely elevated at 980 on admission,secondary to undiagnosed diabetes.  A1c remains pending   Received 5u Lantus last night, still with BSs in the 500s - will start 10u BID, 5u short acting with meals, await A1c.   Increase NS to 125cc/hr  Pt amenable to insulin at home, we will initiate DM teaching today, hopefully home in the am if BSs controlled  Has good insurance, can likely use pens at discharge

## 2021-08-23 NOTE — PROGRESS NOTES
4 Eyes Skin Assessment Completed by Julio RN and KRISTINA Kirkland.    Head WDL  Ears WDL  Nose WDL  Mouth WDL  Neck WDL  Breast/Chest WDL  Shoulder Blades WDL  Spine WDL  (R) Arm/Elbow/Hand WDL  (L) Arm/Elbow/Hand WDL  Abdomen WDL  Groin WDL  Scrotum/Coccyx/Buttocks WDL  (R) Leg WDL  (L) Leg WDL  (R) Heel/Foot/Toe WDL  (L) Heel/Foot/Toe WDL          Devices In Places Blood Pressure Cuff      Interventions In Place Pillows    Possible Skin Injury No    Pictures Uploaded Into Epic N/A  Wound Consult Placed N/A  RN Wound Prevention Protocol Ordered No

## 2021-08-23 NOTE — ED NOTES
Pt on cardiac monitor, continuous pulse ox and cycling bp. Pt updated on plan of care, stated understanding of thus. Call bell within reach. Pt denies questions or needs at this time.

## 2021-08-23 NOTE — CARE PLAN
The patient is Watcher - Medium risk of patient condition declining or worsening         Progress made toward(s) clinical / shift goals:  blood glucose improving    Patient is not progressing towards the following goals:

## 2021-08-23 NOTE — H&P
Hospital Medicine History & Physical Note    Date of Service  8/22/2021    Primary Care Physician  Ani Roca D.O.    Consultants  None   Code Status  Full Code    Chief Complaint  Chief Complaint   Patient presents with   • Urinary Frequency   • Blurred Vision   • Vomiting     History of Presenting Illness  Ace Mcneill is a 41 y.o. male with a past medical history of gout, gastroesophageal flux disease and chronic kidney disease stage III who presented 8/22/2021 with episodes of blurry vision, generalized weakness polyuria and polydipsia for 2 weeks.  Patient reports that he has been having progressive generalized weakness and increased urination and increased thirst over the past 2 weeks.  He reports that he had elevated blood sugars on prior reading but never diagnosed with diabetes.  Denies having shortness of breath no extremity pain redness or swelling.  He denies having dysuria, urgency or suprapubic pain.    I discussed the plan of care with patient.    Review of Systems  Review of Systems   Constitutional: Positive for malaise/fatigue. Negative for chills and fever.   HENT:        Excessive thirst   Eyes: Negative for discharge and redness.        Intermittent episodes of blurry vision   Respiratory: Negative for cough, shortness of breath and stridor.    Cardiovascular: Negative for chest pain and leg swelling.   Gastrointestinal: Negative for abdominal pain and vomiting.   Genitourinary: Positive for frequency. Negative for flank pain.   Musculoskeletal: Negative for myalgias.   Skin: Negative.    Neurological: Negative for focal weakness.   Endo/Heme/Allergies: Positive for polydipsia. Does not bruise/bleed easily.   Psychiatric/Behavioral: The patient is not nervous/anxious.      Past Medical History   has a past medical history of Gout, Hyperlipidemia, Hypertension, Renal disorder (2001), and Single kidney.    Surgical History   has a past surgical history that includes pr fusion of  knee (1996); inguinal hernia repair child (1981); cranioplasty (1999); and foot orif (8/4/2009).     Family History  family history includes Heart Disease in his maternal aunt.     Social History   reports that he has quit smoking. He smoked 0.00 packs per day. He uses smokeless tobacco. He reports that he does not drink alcohol and does not use drugs.    Allergies  Allergies   Allergen Reactions   • Lisinopril Cough     Medications  Prior to Admission Medications   Prescriptions Last Dose Informant Patient Reported? Taking?   ALLOPURINOL 100 MG PO TABS 8/21/2021 at pm  Yes No   Sig: Take 100 mg by mouth every evening.   PRILOSEC 20 MG CPDR 8/22/2021 at am  Yes No   Sig: Take  by mouth every day.      Facility-Administered Medications: None     Physical Exam  Temp:  [36.4 °C (97.5 °F)] 36.4 °C (97.5 °F)  Pulse:  [106-110] 106  Resp:  [18-26] 26  BP: (116-141)/(78-86) 116/78  SpO2:  [91 %-93 %] 91 %    Physical Exam  Constitutional:       General: He is not in acute distress.     Appearance: He is obese. He is ill-appearing. He is not diaphoretic.   HENT:      Head: Atraumatic.      Right Ear: External ear normal.      Left Ear: External ear normal.      Nose: No congestion or rhinorrhea.      Mouth/Throat:      Mouth: Mucous membranes are dry.   Eyes:      General: No scleral icterus.        Right eye: No discharge.         Left eye: No discharge.      Pupils: Pupils are equal, round, and reactive to light.   Cardiovascular:      Rate and Rhythm: Normal rate and regular rhythm.   Pulmonary:      Effort: Pulmonary effort is normal.   Abdominal:      General: There is no distension.   Musculoskeletal:      Cervical back: Neck supple. No rigidity. No muscular tenderness.      Right lower leg: No edema.      Left lower leg: No edema.   Skin:     General: Skin is dry.      Capillary Refill: Capillary refill takes 2 to 3 seconds.      Coloration: Skin is not jaundiced or pale.   Neurological:      Mental Status: He is  alert and oriented to person, place, and time.      Coordination: Coordination normal.   Psychiatric:         Mood and Affect: Mood normal.         Behavior: Behavior normal.       Laboratory:  Recent Labs     08/22/21 1922   WBC 11.0*   RBC 5.55   HEMOGLOBIN 17.0   HEMATOCRIT 45.3   MCV 81.6   MCH 30.6   MCHC 37.5*   RDW 34.7*   PLATELETCT 280   MPV 10.9     Recent Labs     08/22/21 1922   SODIUM 115*   POTASSIUM 4.4   CHLORIDE 81*   CO2 22   GLUCOSE 982*   BUN 20   CREATININE 1.76*   CALCIUM 9.7     Recent Labs     08/22/21 1922   ALTSGPT 39   ASTSGOT 23   ALKPHOSPHAT 102*   TBILIRUBIN 1.1   GLUCOSE 982*         No results for input(s): NTPROBNP in the last 72 hours.      No results for input(s): TROPONINT in the last 72 hours.    Imaging:  No orders to display     Assessment/Plan:  I anticipate this patient will require at least two midnights for appropriate medical management, necessitating inpatient admission.    * Hyperglycemia- (present on admission)  Assessment & Plan  Extremely elevated 980, likely secondary to undiagnosed diabetes.  I will check glycated hemoglobin   I will start long acting, Premeal & short acting insulin  Accu-Checks, hypoglycemia protocol     Blurry vision- (present on admission)  Assessment & Plan  Likely secondary to extreme elevated blood sugars.  Anticipate improvement with controlling blood sugars.  Consider further diagnostic testing according to clinical course.    Dehydration- (present on admission)  Assessment & Plan  Likely secondary to osmotic diuresis.  Antiemetics as needed, intravenous fluids, insulin    Gout- (present on admission)  Assessment & Plan  Without exacerbation.  Resume home allopurinol    Essential hypertension- (present on admission)  Assessment & Plan  Does not appear to be taking blood pressure medications.  Vital signs per policy, consider adding an ACE inhibitor according to blood pressure trend    GERD (gastroesophageal reflux disease)  Assessment &  Plan  Resume his home omeprazole    Pseudo-hyponatremia- (present on admission)  Assessment & Plan  Measured sodium 115 a bit component of this is secondary to hyperglycemia.  Corrected sodium is between 129-136.  Anticipate measured sodium to improve with correcting blood sugars    CKD (chronic kidney disease), stage III (HCC)- (present on admission)  Assessment & Plan  Avoid nephrotoxins as much as possible, renally dose medications, monitor inputs and outputs     VTE prophylaxis: SCDs/TEDs and enoxaparin ppx

## 2021-08-23 NOTE — ASSESSMENT & PLAN NOTE
Likely secondary to extreme elevated blood sugars.  Anticipate improvement with controlling blood sugars.  Consider further diagnostic testing according to clinical course.

## 2021-08-23 NOTE — PROGRESS NOTES
[facility] Medicine Daily Progress Note    Date of Service  8/23/2021    Chief Complaint  Ace Mcneill is a 41 y.o. male admitted 8/22/2021 with elevated blood sugar    Hospital Course  Ace Mcneill is a 41 y.o. male with a past medical history of gout, gastroesophageal flux disease and chronic kidney disease stage III who presented 8/22/2021 with episodes of blurry vision, generalized weakness polyuria and polydipsia for 2 weeks.  Patient reports that he has been having progressive generalized weakness and increased urination and increased thirst over the past 2 weeks.  He reports that he had elevated blood sugars on prior reading but never diagnosed with diabetes.  Denies having shortness of breath no extremity pain redness or swelling.  He denies having dysuria, urgency or suprapubic pain.       Interval Problem Update  8/23 - Pt received 5u Lantus last night, BSs still in the 500s.  Based on weight and given that he is insulin naive, will give 10u BID starting this morning, 5u with meals and monitor, will likely need to titrate up. A1c remains pending. Bps are stable, afebrile. Pt amenable to home insulin.  States at his yearly work physicals, has had BSs in the 200-300 range for 6 years but was never prescribed anything.     I have personally seen and examined the patient at bedside. I discussed the plan of care with patient and bedside RN.    Consultants/Specialty  None    Code Status  Full Code    Disposition  Patient is not medically cleared.   Anticipate discharge to to home with close outpatient follow-up.  I have placed the appropriate orders for post-discharge needs.    Review of Systems  Review of Systems   Constitutional: Negative for chills and fever.   Respiratory: Negative for cough and shortness of breath.    Cardiovascular: Negative for chest pain and leg swelling.   Gastrointestinal: Negative for abdominal pain, nausea and vomiting.   Genitourinary: Positive for frequency. Negative  for dysuria.   Musculoskeletal: Negative for myalgias.   Neurological: Negative for dizziness.   All other systems reviewed and are negative.       Physical Exam  Temp:  [36.4 °C (97.5 °F)-36.8 °C (98.2 °F)] 36.8 °C (98.2 °F)  Pulse:  [] 84  Resp:  [17-26] 18  BP: (103-141)/(44-86) 105/58  SpO2:  [91 %-94 %] 94 %    Physical Exam  Vitals and nursing note reviewed.   Constitutional:       Appearance: Normal appearance.   HENT:      Head: Normocephalic and atraumatic.      Mouth/Throat:      Mouth: Mucous membranes are dry.   Cardiovascular:      Rate and Rhythm: Normal rate and regular rhythm.   Pulmonary:      Effort: Pulmonary effort is normal.      Breath sounds: Normal breath sounds.   Abdominal:      General: Abdomen is flat. Bowel sounds are normal.      Palpations: Abdomen is soft.   Musculoskeletal:         General: No swelling.   Skin:     General: Skin is warm and dry.   Neurological:      General: No focal deficit present.      Mental Status: He is alert and oriented to person, place, and time.   Psychiatric:         Mood and Affect: Mood normal.         Behavior: Behavior normal.         Fluids    Intake/Output Summary (Last 24 hours) at 8/23/2021 0802  Last data filed at 8/22/2021 2252  Gross per 24 hour   Intake --   Output 1500 ml   Net -1500 ml       Laboratory  Recent Labs     08/22/21 1922 08/23/21  0426   WBC 11.0* 10.6   RBC 5.55 5.18   HEMOGLOBIN 17.0 15.7   HEMATOCRIT 45.3 43.6   MCV 81.6 84.2   MCH 30.6 30.3   MCHC 37.5* 36.0*   RDW 34.7* 36.0   PLATELETCT 280 236   MPV 10.9 10.9     Recent Labs     08/22/21 1922 08/22/21 2218 08/23/21  0426   SODIUM 115*  --  126*   POTASSIUM 4.4  --  4.0   CHLORIDE 81*  --  90*   CO2 22  --  24   GLUCOSE 982* 811* 559*   BUN 20  --  21   CREATININE 1.76*  --  1.59*   CALCIUM 9.7  --  9.3                   Imaging  No orders to display        Assessment/Plan  * Hyperglycemia- (present on admission)  Assessment & Plan  Extremely elevated at 980 on  admission,secondary to undiagnosed diabetes.  A1c remains pending   Received 5u Lantus last night, still with BSs in the 500s - will start 10u BID, 5u short acting with meals, await A1c.   Increase NS to 125cc/hr  Pt amenable to insulin at home, we will initiate DM teaching today, hopefully home in the am if BSs controlled  Has good insurance, can likely use pens at discharge    Solitary kidney  Assessment & Plan  - Avoid nephrotoxic agents      Blurry vision- (present on admission)  Assessment & Plan  Likely secondary to extreme elevated blood sugars.  Anticipate improvement with controlling blood sugars.  Consider further diagnostic testing according to clinical course.    Dehydration- (present on admission)  Assessment & Plan  Secondary to osmotic diuresis.  Antiemetics as needed, intravenous fluids, insulin    Gout- (present on admission)  Assessment & Plan  Without exacerbation.  Resumed home allopurinol    Essential hypertension- (present on admission)  Assessment & Plan  Does not appear to be taking blood pressure medications.  Bps currently in the low 100s SBP, given his solitary kidney, hypotension could precipitate significant TAURUS - will hold off on ACEI addition for now     GERD (gastroesophageal reflux disease)  Assessment & Plan  Resumed his home omeprazole    Pseudo-hyponatremia- (present on admission)  Assessment & Plan  Corrected sodium is 133  Anticipate measured sodium to improve with correcting blood sugars    CKD (chronic kidney disease), stage III (HCC)- (present on admission)  Assessment & Plan  Avoid nephrotoxins as much as possible, renally dose medications, monitor inputs and outputs        VTE prophylaxis: enoxaparin ppx    I have performed a physical exam and reviewed and updated ROS and Plan today (8/23/2021). In review of yesterday's note (8/22/2021), there are no changes except as documented above.

## 2021-08-23 NOTE — ASSESSMENT & PLAN NOTE
Does not appear to be taking blood pressure medications.  Bps currently in the low 100s SBP, given his solitary kidney, hypotension could precipitate significant TAURUS - will hold off on ACEI addition for now

## 2021-08-23 NOTE — ED PROVIDER NOTES
ED Provider Note    CHIEF COMPLAINT  Chief Complaint   Patient presents with   • Urinary Frequency   • Blurred Vision   • Vomiting       HPI  Ace Mcneill is a 41 y.o. male who presents with 2 weeks of increased thirst and urination.  He has had a week of blurred vision.  He has had some episodic facial numbness and generalized weakness of the legs today.  Family history of diabetes in grandparents.  For 2 years at his annual work place physical he has had elevated blood glucose in the 3-400 range and was told to watch it.  He has never been diagnosed with diabetes.  History of hypertension and gout.  He has antibodies to Covid and is not vaccinated.  Denies fever headache cough shortness of breath chest pain and abdominal pain.    REVIEW OF SYSTEMS  Pertinent positives include: Pyuria polydipsia blurred vision.  Vomited twice yesterday  Pertinent negatives include: Abdominal pain, diarrhea, constipation, dysuria, urgency, frequency, rash, swelling, nausea.  10+ systems reviewed and negative.      PAST MEDICAL HISTORY  Past Medical History:   Diagnosis Date   • Gout    • Hyperlipidemia    • Hypertension    • Renal disorder 2001    donated left kidney; one remaining   • Single kidney        FAMILY HISTORY  Diabetes mellitus in secondary relatives    SOCIAL HISTORY  Social History     Tobacco Use   • Smoking status: Former Smoker     Packs/day: 0.00   • Smokeless tobacco: Current User   Substance Use Topics   • Alcohol use: No   • Drug use: No     Social History     Substance and Sexual Activity   Drug Use No       SURGICAL HISTORY  Past Surgical History:   Procedure Laterality Date   • FOOT ORIF  8/4/2009    Performed by NHUNG CHAVEZ at SURGERY [facility]   • CRANIOPLASTY  1999   • PB FUSION OF KNEE  1996    right    • INGUINAL HERNIA REPAIR CHILD  1981    left        CURRENT MEDICATIONS    Current Outpatient Medications   Medication Sig Dispense Refill   • ALLOPURINOL 100 MG PO TABS Take 100 mg by  "mouth every day.     • PRILOSEC 20 MG CPDR Take  by mouth every day.         ALLERGIES  Allergies   Allergen Reactions   • Lisinopril Cough       PHYSICAL EXAM  VITAL SIGNS: /86   Pulse (!) 110   Temp 36.4 °C (97.5 °F) (Temporal)   Resp 18   Ht 1.88 m (6' 2\")   Wt (!) 145 kg (319 lb 10.7 oz)   SpO2 93%   BMI 41.04 kg/m²   Reviewed and tachycardic, hypertensive, afebrile  Constitutional: Well developed, Well nourished, elevated BMI.  HENT: Normocephalic, atraumatic, bilateral external ears normal, Wearing mask.   Eyes: PERRLA, conjunctiva pink, no scleral icterus.   Cardiovascular: Tachycardic, regular S1-S2 without murmur, rub, gallop.  No dependent edema or calf tenderness.  Respiratory: No rales, rhonchi, wheeze or cough.  Gastrointestinal: Soft, nontender, nondistended, no organomegaly.  Skin: No erythema, no rash.   Genitourinary:  No costovertebral angle tenderness.   Neurologic: Alert & oriented x 3, cranial nerves 2-12 intact by passive exam.  No focal deficit noted.  Psychiatric: Affect normal, Judgment normal, Mood normal.     DIFFERENTIAL DIAGNOSIS:  New onset type 2 diabetes, hyperosmolar nonketotic state, UTI doubt DKA.    LABORATORY:  Results for orders placed or performed during the hospital encounter of 08/22/21   CBC WITH DIFFERENTIAL   Result Value Ref Range    WBC 11.0 (H) 4.8 - 10.8 K/uL    RBC 5.55 4.70 - 6.10 M/uL    Hemoglobin 17.0 14.0 - 18.0 g/dL    Hematocrit 45.3 42.0 - 52.0 %    MCV 81.6 81.4 - 97.8 fL    MCH 30.6 27.0 - 33.0 pg    MCHC 37.5 (H) 33.7 - 35.3 g/dL    RDW 34.7 (L) 35.9 - 50.0 fL    Platelet Count 280 164 - 446 K/uL    MPV 10.9 9.0 - 12.9 fL    Neutrophils-Polys 68.70 44.00 - 72.00 %    Lymphocytes 22.40 22.00 - 41.00 %    Monocytes 6.70 0.00 - 13.40 %    Eosinophils 1.10 0.00 - 6.90 %    Basophils 0.60 0.00 - 1.80 %    Immature Granulocytes 0.50 0.00 - 0.90 %    Nucleated RBC 0.00 /100 WBC    Neutrophils (Absolute) 7.55 (H) 1.82 - 7.42 K/uL    Lymphs (Absolute) " 2.47 1.00 - 4.80 K/uL    Monos (Absolute) 0.74 0.00 - 0.85 K/uL    Eos (Absolute) 0.12 0.00 - 0.51 K/uL    Baso (Absolute) 0.07 0.00 - 0.12 K/uL    Immature Granulocytes (abs) 0.06 0.00 - 0.11 K/uL    NRBC (Absolute) 0.00 K/uL   Comp Metabolic Panel   Result Value Ref Range    Sodium 115 (LL) 135 - 145 mmol/L    Potassium 4.4 3.6 - 5.5 mmol/L    Chloride 81 (L) 96 - 112 mmol/L    Co2 22 20 - 33 mmol/L    Anion Gap 12.0 7.0 - 16.0    Glucose 982 (HH) 65 - 99 mg/dL    Bun 20 8 - 22 mg/dL    Creatinine 1.76 (H) 0.50 - 1.40 mg/dL    Calcium 9.7 8.4 - 10.2 mg/dL    AST(SGOT) 23 12 - 45 U/L    ALT(SGPT) 39 2 - 50 U/L    Alkaline Phosphatase 102 (H) 30 - 99 U/L    Total Bilirubin 1.1 0.1 - 1.5 mg/dL    Albumin 3.9 3.2 - 4.9 g/dL    Total Protein 6.2 6.0 - 8.2 g/dL    Globulin 2.3 1.9 - 3.5 g/dL    A-G Ratio 1.7 g/dL   URINALYSIS    Specimen: Urine   Result Value Ref Range    Color Yellow     Character Clear     Specific Gravity <=1.005 <1.035    Ph 6.0 5.0 - 8.0    Glucose >=1000 (A) Negative mg/dL    Ketones Negative Negative mg/dL    Protein Negative Negative mg/dL    Bilirubin Negative Negative    Nitrite Negative Negative    Leukocyte Esterase Negative Negative    Occult Blood Negative Negative    Micro Urine Req see below    POCT glucose device results   Result Value Ref Range    Glucose - Accu-Ck >600 (HH) 65 - 99 mg/dL       INTERVENTIONS:  NS infusion 1,000 mL (0 mL Intravenous Stopped 8/22/21 2054)   metFORMIN (GLUCOPHAGE) tablet 500 mg (500 mg Oral Given 8/22/21 2054)   insulin regular (HumuLIN R,NovoLIN R) injection (10 Units Subcutaneous Given 8/22/21 2051)     Case discussed with Dr. Mendoza hospitalist who will admit the patient for glycemic control and education    COURSE & MEDICAL DECISION MAKING  Patient presents with new onset uncontrolled diabetes.  His hyponatremia corrects when adjusted for serum glucose.  There is no other electrolyte disorder, hyperosmolar state or altered mental status.  He has  mild renal insufficiency and a single kidney.  This is near his baseline as he reports however.    PLAN:  Admission for glycemic control, IV fluids, diabetes education    CONDITION: Fair.    FINAL IMPRESSION  1. Uncontrolled type 2 diabetes mellitus with hyperglycemia (HCC)    2. New onset type 2 diabetes mellitus (HCC)    3. Renal insufficiency    4.      Single kidney      Electronically signed by: Horace Mack M.D., 8/22/2021 7:21 PM

## 2021-08-23 NOTE — ED TRIAGE NOTES
"Presents complaining of polyuria, and polydipsia recurring for the past 2 weeks.  He describes a state of generalized malaise with weakness and visual disturbances for\"about a week.\"   His FSBS in triage does not produce a numerical value, but it indicates a \"HIGH\" reading exceeding 600 mg/dl  Chief Complaint   Patient presents with   • Urinary Frequency   • Blurred Vision   • Vomiting     /86   Pulse (!) 110   Temp 36.4 °C (97.5 °F) (Temporal)   Resp 18   Ht 1.88 m (6' 2\")   Wt (!) 145 kg (319 lb 10.7 oz)   SpO2 93%   BMI 41.04 kg/m²      "

## 2021-08-23 NOTE — ED NOTES
Medication reconciliation process completed by interviewing the patient at bedside.     The patient reports taking the supplement Protandim® daily (ingredients: Bacopa extract 150 mg, milk thistle 225mg, ashwagandha 150 mg, green tea 75 mg, turmeric 75 mg).    No antibiotics in the last 30 days.    Allergies have been verified.    Jason RagsdaleD, BCPS

## 2021-08-23 NOTE — DIETARY
NUTRITION SERVICES: BMI - Pt with BMI >40 (=Body mass index is 41.04 kg/m².), Class III obesity. Weight loss counseling not appropriate in acute care setting. RECOMMEND - If appropriate at DC please refer to outpatient nutrition services for weight management.

## 2021-08-24 VITALS
HEART RATE: 87 BPM | SYSTOLIC BLOOD PRESSURE: 137 MMHG | BODY MASS INDEX: 40.43 KG/M2 | TEMPERATURE: 97.6 F | OXYGEN SATURATION: 93 % | DIASTOLIC BLOOD PRESSURE: 102 MMHG | HEIGHT: 74 IN | RESPIRATION RATE: 17 BRPM | WEIGHT: 315 LBS

## 2021-08-24 LAB
ALBUMIN SERPL BCP-MCNC: 3.1 G/DL (ref 3.2–4.9)
ALBUMIN/GLOB SERPL: 1.6 G/DL
ALP SERPL-CCNC: 74 U/L (ref 30–99)
ALT SERPL-CCNC: 52 U/L (ref 2–50)
ANION GAP SERPL CALC-SCNC: 10 MMOL/L (ref 7–16)
AST SERPL-CCNC: 43 U/L (ref 12–45)
BASOPHILS # BLD AUTO: 0.9 % (ref 0–1.8)
BASOPHILS # BLD: 0.07 K/UL (ref 0–0.12)
BILIRUB SERPL-MCNC: 1 MG/DL (ref 0.1–1.5)
BUN SERPL-MCNC: 14 MG/DL (ref 8–22)
CALCIUM SERPL-MCNC: 8.4 MG/DL (ref 8.4–10.2)
CHLORIDE SERPL-SCNC: 99 MMOL/L (ref 96–112)
CO2 SERPL-SCNC: 23 MMOL/L (ref 20–33)
CORTIS SERPL-MCNC: 6.7 UG/DL (ref 0–23)
CREAT SERPL-MCNC: 1.21 MG/DL (ref 0.5–1.4)
EOSINOPHIL # BLD AUTO: 0.23 K/UL (ref 0–0.51)
EOSINOPHIL NFR BLD: 2.9 % (ref 0–6.9)
ERYTHROCYTE [DISTWIDTH] IN BLOOD BY AUTOMATED COUNT: 36.7 FL (ref 35.9–50)
GLOBULIN SER CALC-MCNC: 1.9 G/DL (ref 1.9–3.5)
GLUCOSE BLD-MCNC: 301 MG/DL (ref 65–99)
GLUCOSE BLD-MCNC: 376 MG/DL (ref 65–99)
GLUCOSE BLD-MCNC: 378 MG/DL (ref 65–99)
GLUCOSE BLD-MCNC: 417 MG/DL (ref 65–99)
GLUCOSE SERPL-MCNC: 376 MG/DL (ref 65–99)
HCT VFR BLD AUTO: 42.2 % (ref 42–52)
HGB BLD-MCNC: 15.3 G/DL (ref 14–18)
IMM GRANULOCYTES # BLD AUTO: 0.06 K/UL (ref 0–0.11)
IMM GRANULOCYTES NFR BLD AUTO: 0.7 % (ref 0–0.9)
LYMPHOCYTES # BLD AUTO: 2.58 K/UL (ref 1–4.8)
LYMPHOCYTES NFR BLD: 32 % (ref 22–41)
MAGNESIUM SERPL-MCNC: 1.8 MG/DL (ref 1.5–2.5)
MCH RBC QN AUTO: 30.5 PG (ref 27–33)
MCHC RBC AUTO-ENTMCNC: 36.3 G/DL (ref 33.7–35.3)
MCV RBC AUTO: 84.1 FL (ref 81.4–97.8)
MONOCYTES # BLD AUTO: 0.46 K/UL (ref 0–0.85)
MONOCYTES NFR BLD AUTO: 5.7 % (ref 0–13.4)
NEUTROPHILS # BLD AUTO: 4.67 K/UL (ref 1.82–7.42)
NEUTROPHILS NFR BLD: 57.8 % (ref 44–72)
NRBC # BLD AUTO: 0 K/UL
NRBC BLD-RTO: 0 /100 WBC
PHOSPHATE SERPL-MCNC: 2.7 MG/DL (ref 2.5–4.5)
PLATELET # BLD AUTO: 212 K/UL (ref 164–446)
PMV BLD AUTO: 10.6 FL (ref 9–12.9)
POTASSIUM SERPL-SCNC: 4.3 MMOL/L (ref 3.6–5.5)
PROT SERPL-MCNC: 5 G/DL (ref 6–8.2)
RBC # BLD AUTO: 5.02 M/UL (ref 4.7–6.1)
SODIUM SERPL-SCNC: 132 MMOL/L (ref 135–145)
WBC # BLD AUTO: 8.1 K/UL (ref 4.8–10.8)

## 2021-08-24 PROCEDURE — 80053 COMPREHEN METABOLIC PANEL: CPT

## 2021-08-24 PROCEDURE — 85025 COMPLETE CBC W/AUTO DIFF WBC: CPT

## 2021-08-24 PROCEDURE — 36415 COLL VENOUS BLD VENIPUNCTURE: CPT

## 2021-08-24 PROCEDURE — 99239 HOSP IP/OBS DSCHRG MGMT >30: CPT | Performed by: INTERNAL MEDICINE

## 2021-08-24 PROCEDURE — 84100 ASSAY OF PHOSPHORUS: CPT

## 2021-08-24 PROCEDURE — 700105 HCHG RX REV CODE 258: Performed by: FAMILY MEDICINE

## 2021-08-24 PROCEDURE — 82962 GLUCOSE BLOOD TEST: CPT | Mod: 91

## 2021-08-24 PROCEDURE — 83735 ASSAY OF MAGNESIUM: CPT

## 2021-08-24 PROCEDURE — 700102 HCHG RX REV CODE 250 W/ 637 OVERRIDE(OP): Performed by: HOSPITALIST

## 2021-08-24 PROCEDURE — 82533 TOTAL CORTISOL: CPT

## 2021-08-24 PROCEDURE — 82024 ASSAY OF ACTH: CPT

## 2021-08-24 PROCEDURE — A9270 NON-COVERED ITEM OR SERVICE: HCPCS | Performed by: HOSPITALIST

## 2021-08-24 PROCEDURE — 94760 N-INVAS EAR/PLS OXIMETRY 1: CPT

## 2021-08-24 RX ORDER — DEXTROSE MONOHYDRATE 25 G/50ML
50 INJECTION, SOLUTION INTRAVENOUS
Status: DISCONTINUED | OUTPATIENT
Start: 2021-08-24 | End: 2021-08-24 | Stop reason: HOSPADM

## 2021-08-24 RX ORDER — DEXTROSE MONOHYDRATE 25 G/50ML
50 INJECTION, SOLUTION INTRAVENOUS
Status: DISCONTINUED | OUTPATIENT
Start: 2021-08-24 | End: 2021-08-24

## 2021-08-24 RX ORDER — LANCETS 30 GAUGE
EACH MISCELLANEOUS
Qty: 100 EACH | Refills: 0 | Status: SHIPPED | OUTPATIENT
Start: 2021-08-24

## 2021-08-24 RX ORDER — GLUCOSAMINE HCL/CHONDROITIN SU 500-400 MG
CAPSULE ORAL
Qty: 100 EACH | Refills: 0 | Status: SHIPPED | OUTPATIENT
Start: 2021-08-24

## 2021-08-24 RX ORDER — INSULIN GLARGINE 100 [IU]/ML
20 INJECTION, SOLUTION SUBCUTANEOUS 2 TIMES DAILY
Qty: 1 EACH | Refills: 1 | Status: SHIPPED | OUTPATIENT
Start: 2021-08-24

## 2021-08-24 RX ORDER — INSULIN LISPRO 100 [IU]/ML
INJECTION, SOLUTION INTRAVENOUS; SUBCUTANEOUS
Qty: 1 EACH | Refills: 1 | Status: SHIPPED | OUTPATIENT
Start: 2021-08-24

## 2021-08-24 RX ORDER — INSULIN LISPRO 100 [IU]/ML
2-9 INJECTION, SOLUTION INTRAVENOUS; SUBCUTANEOUS
Status: DISCONTINUED | OUTPATIENT
Start: 2021-08-24 | End: 2021-08-24 | Stop reason: HOSPADM

## 2021-08-24 RX ORDER — INSULIN LISPRO 100 [IU]/ML
5 INJECTION, SOLUTION INTRAVENOUS; SUBCUTANEOUS
Status: DISCONTINUED | OUTPATIENT
Start: 2021-08-24 | End: 2021-08-24

## 2021-08-24 RX ORDER — INSULIN LISPRO 100 [IU]/ML
1-6 INJECTION, SOLUTION INTRAVENOUS; SUBCUTANEOUS
Status: DISCONTINUED | OUTPATIENT
Start: 2021-08-24 | End: 2021-08-24

## 2021-08-24 RX ORDER — INSULIN LISPRO 100 [IU]/ML
0.2 INJECTION, SOLUTION INTRAVENOUS; SUBCUTANEOUS
Status: DISCONTINUED | OUTPATIENT
Start: 2021-08-24 | End: 2021-08-24 | Stop reason: HOSPADM

## 2021-08-24 RX ADMIN — INSULIN LISPRO 6 UNITS: 100 INJECTION, SOLUTION INTRAVENOUS; SUBCUTANEOUS at 06:29

## 2021-08-24 RX ADMIN — OMEPRAZOLE 20 MG: 20 CAPSULE, DELAYED RELEASE ORAL at 06:26

## 2021-08-24 RX ADMIN — INSULIN LISPRO 5 UNITS: 100 INJECTION, SOLUTION INTRAVENOUS; SUBCUTANEOUS at 06:31

## 2021-08-24 RX ADMIN — SODIUM CHLORIDE: 9 INJECTION, SOLUTION INTRAVENOUS at 03:40

## 2021-08-24 RX ADMIN — INSULIN LISPRO 10 UNITS: 100 INJECTION, SOLUTION INTRAVENOUS; SUBCUTANEOUS at 11:08

## 2021-08-24 RX ADMIN — INSULIN LISPRO 8 UNITS: 100 INJECTION, SOLUTION INTRAVENOUS; SUBCUTANEOUS at 11:07

## 2021-08-24 ASSESSMENT — PAIN DESCRIPTION - PAIN TYPE: TYPE: ACUTE PAIN

## 2021-08-24 NOTE — CARE PLAN
The patient is Stable - Low risk of patient condition declining or worsening    Shift Goals  Clinical Goals: Blood sugar will trend down  Patient Goals: rest and discharge home     Progress made toward(s) clinical / shift goals: Patient BG decreased throughout shift.     Problem: Diabetes Management  Goal: Patient will achieve and maintain glucose in satisfactory range  Outcome: Progressing  Note: Patient B, acceptable for discharge per MD.

## 2021-08-24 NOTE — CARE PLAN
The patient is Watcher - Medium risk of patient condition declining or worsening    Shift Goals  Clinical Goals: Monitor blood glucose    Progress made toward(s) clinical / shift goals: Patient blood glucose progressing.    Problem: Diabetes Management  Goal: Patient will achieve and maintain glucose in satisfactory range  Outcome: Progressing  Note: Patient BG improving through shift, most recent B.     Problem: Knowledge Deficit - Diabetes  Goal: Patient will demonstrate knowledge of insulin injection, symptoms, and treatment of hypoglycemia and diet prior to discharge  Outcome: Progressing  Note: Patient open and willing to learn about diagnosis.

## 2021-08-24 NOTE — DISCHARGE PLANNING
Anticipated Discharge Disposition: Home     Action: Pt discussed during IDT rounds. Pt to d/c with no needs. Pt has a six clicks score of 24.     Barriers to Discharge: None     Plan: LSW to assist as needed    Care Transition Team Assessment    Information Source  Orientation Level: Oriented X4  Information Given By: Other (Comments)  Who is responsible for making decisions for patient? : Patient    Elopement Risk  Legal Hold: No  Ambulatory or Self Mobile in Wheelchair: Yes  Disoriented: No  Psychiatric Symptoms: None  History of Wandering: No  Elopement this Admit: No  Vocalizing Wanting to Leave: No  Displays Behaviors, Body Language Wanting to Leave: No-Not at Risk for Elopement  Elopement Risk: Not at Risk for Elopement    Interdisciplinary Discharge Planning  Lives with - Patient's Self Care Capacity: (P) Other (Comments)  Patient or legal guardian wants to designate a caregiver: No  Support Systems: (P) Children, Friends / Neighbors  Housing / Facility: (P) 1 Story House  Patient Prefers to be Discharged to:: (P) Home  Durable Medical Equipment: (P) Not Applicable    Discharge Preparedness  What is your plan after discharge?: Home with help  What are your discharge supports?: Other (comment)  Prior Functional Level: Independent with Activities of Daily Living, Independent with Medication Management    Functional Assesment  Prior Functional Level: Independent with Activities of Daily Living, Independent with Medication Management    Finances  Financial Barriers to Discharge: No  Prescription Coverage: Yes    Vision / Hearing Impairment  Vision Impairment : Yes  Right Eye Vision: Impaired, Wears Contacts  Left Eye Vision: Impaired, Wears Contacts    Domestic Abuse  Have you ever been the victim of abuse or violence?: No  Physical Abuse or Sexual Abuse: No  Verbal Abuse or Emotional Abuse: No  Possible Abuse/Neglect Reported to:: Not Applicable    Psychological Assessment  History of Substance Abuse:  None  History of Psychiatric Problems: No    Discharge Risks or Barriers  Discharge risks or barriers?: No    Anticipated Discharge Information  Discharge Disposition: Discharged to home/self care (01)

## 2021-08-24 NOTE — PROGRESS NOTES
Received report from night shift RN. Patient A&Ox4, denies pain, N/V, SOB. Patient belongings, call light in reach. Bed in lowest, locked position, safety precautions in place. Patient denies further needs at this time.

## 2021-08-24 NOTE — DISCHARGE SUMMARY
Discharge Summary    CHIEF COMPLAINT ON ADMISSION  Chief Complaint   Patient presents with   • Urinary Frequency   • Blurred Vision   • Vomiting       Reason for Admission  Frequest urinating; Facial Numbness     Admission Date  8/22/2021    CODE STATUS  Full Code    HPI & HOSPITAL COURSE  This is a 41 y.o. male here with urinary frequency and blurred vision.  His blood sugar was 980 in the ED.  He was started on IV hydration and long/short acting insulin with improvement in his sugars.  Blurred vision and thirst have improved with the control of his blood sugars.  He will be prescribed lantus bid, meal time insulin scale and glucometer with test strips.  HGB A1C was 11.9 so despite the symptoms being apparent now, his diabetes has been present.  He will follow up with PCP for further diabetic education and adjustment of his medications.       Therefore, he is discharged in good and stable condition to home with close outpatient follow-up.    The patient met 2-midnight criteria for an inpatient stay at the time of discharge.    Discharge Date  8/24/2021    FOLLOW UP ITEMS POST DISCHARGE  PCP - 1 week    DISCHARGE DIAGNOSES  Principal Problem:    Hyperglycemia POA: Yes  Active Problems:    CKD (chronic kidney disease), stage III ([facility]) POA: Yes    Pseudo-hyponatremia POA: Yes    GERD (gastroesophageal reflux disease) POA: Unknown    Essential hypertension POA: Yes    Gout POA: Yes    Dehydration POA: Yes    Blurry vision POA: Yes    Solitary kidney POA: Unknown  Resolved Problems:    * No resolved hospital problems. *      FOLLOW UP  No future appointments.  Ani Roca D.O.  [address]  [place] [id number] [phone]    In 1 week        MEDICATIONS ON DISCHARGE     Medication List      START taking these medications      Instructions   Alcohol Swabs   Doctor's comments: Per formulary preference. ICD-10 code: E11.65 Uncontrolled type 2 Diabetes Mellitus  Wipe site with prep pad prior to injection.      * Blood Glucose Meter Kit   Doctor's comments: Or per formulary preference. ICD-10 code: E11.65 Uncontrolled type 2 Diabetes Mellitus  Test blood sugar as recommended by provider. One Touch Ultra 2 blood glucose monitoring kit.     * Blood Glucose Test Strips   Doctor's comments: Or per formulary preference. ICD-10 code: E11.65 Uncontrolled type 2 Diabetes Mellitus  Use one One Touch Ultra 2 strip to test blood sugar three times daily before meals.     insulin lispro 100 UNIT/ML Sopn injection PEN  Commonly known as: HumaLOG,AdmeLOG   70   - 150  mg/dL =      0 Units 151 - 200  mg/dL =    3 Units 201 - 250  mg/dL =    4 Units 251 - 300  mg/dL  =   7 Units 301 - 350  mg/dL  =   10 Units 351 - 400 mg/dL   =   12 Units Over 400 mg/dL   =   14 Units     Insulin Pen Needle 32 G x 4 mm   Doctor's comments: Per patient/formulary preference. ICD-10 code: E11.65 Uncontrolled type 2 Diabetes Mellitus  Use one pen needle in pen device to inject insulin five times daily.     Lancets   Doctor's comments: Or per formulary preference. ICD-10 code: E11.65 Uncontrolled type 2 Diabetes Mellitus  Use one One Touch Ultra 2 lancet to test blood sugar three times daily before meals.     Lantus SoloStar 100 UNIT/ML Sopn injection  Generic drug: insulin glargine   Inject 20 Units under the skin 2 times a day.  Dose: 20 Units         * This list has 2 medication(s) that are the same as other medications prescribed for you. Read the directions carefully, and ask your doctor or other care provider to review them with you.            CONTINUE taking these medications      Instructions   allopurinol 100 MG Tabs  Commonly known as: ZYLOPRIM   Take 100 mg by mouth every evening.  Dose: 100 mg     PriLOSEC 20 MG delayed-release capsule  Generic drug: omeprazole   Take  by mouth every day.     valsartan 80 MG Tabs  Commonly known as: DIOVAN   Take 80 mg by mouth every day.  Dose: 80 mg            Allergies  Allergies   Allergen Reactions   •  Lisinopril Cough       DIET  Orders Placed This Encounter   Procedures   • Diet Order Diet: Consistent CHO (Diabetic)     Standing Status:   Standing     Number of Occurrences:   1     Order Specific Question:   Diet:     Answer:   Consistent CHO (Diabetic) [4]       ACTIVITY  As tolerated.  Weight bearing as tolerated    CONSULTATIONS  none    PROCEDURES  none    LABORATORY  Lab Results   Component Value Date    SODIUM 132 (L) 08/24/2021    POTASSIUM 4.3 08/24/2021    CHLORIDE 99 08/24/2021    CO2 23 08/24/2021    GLUCOSE 376 (H) 08/24/2021    BUN 14 08/24/2021    CREATININE 1.21 08/24/2021    CREATININE 1.4 03/15/2007        Lab Results   Component Value Date    WBC 8.1 08/24/2021    HEMOGLOBIN 15.3 08/24/2021    HEMATOCRIT 42.2 08/24/2021    PLATELETCT 212 08/24/2021        Total time of the discharge process exceeds 35 minutes.

## 2021-08-24 NOTE — PROGRESS NOTES
Received report from Day shift RN. Pt is laying in bed, A+OX4 , showing no signs of distress. Pt c/o 0/10 pain and denies the need for pain medication. VSS. CMS intact. Pt educated on POC, chart reviewed. Call light and belongings at bedside and within reach. All questions answered at this time.

## 2021-08-24 NOTE — CARE PLAN
The patient is Watcher - Medium risk of patient condition declining or worsening    Shift Goals  Clinical Goals: Blood sugar will continue to trend down   Patient Goals: rest and discharge home     Progress made toward(s) clinical / shift goals:  Blood sugars continue to be high but have been trending down since admission    Patient is not progressing towards the following goals: n/a      Problem: Knowledge Deficit - Standard  Goal: Patient and family/care givers will demonstrate understanding of plan of care, disease process/condition, diagnostic tests and medications  Outcome: Progressing     Problem: Diabetes Management  Goal: Patient will achieve and maintain glucose in satisfactory range  Outcome: Progressing     Problem: Knowledge Deficit - Diabetes  Goal: Patient will demonstrate knowledge of insulin injection, symptoms, and treatment of hypoglycemia and diet prior to discharge  Outcome: Progressing     Problem: Discharge Planning - Diabetes  Goal: Patient's continuum of care needs will be met  Outcome: Progressing     Problem: Skin Integrity - Diabetes  Goal: Patient's skin on legs and feet will remain intact while hospitalized  Outcome: Progressing     Problem: Infection - Diabetes  Goal: Patient will remain free from signs and symptoms of infection  Outcome: Progressing

## 2021-08-24 NOTE — DISCHARGE INSTRUCTIONS
Blood Glucose Monitoring, Adult  Monitoring your blood sugar (glucose) is an important part of managing your diabetes (diabetes mellitus). Blood glucose monitoring involves checking your blood glucose as often as directed and keeping a record (log) of your results over time.  Checking your blood glucose regularly and keeping a blood glucose log can:  · Help you and your health care provider adjust your diabetes management plan as needed, including your medicines or insulin.  · Help you understand how food, exercise, illnesses, and medicines affect your blood glucose.  · Let you know what your blood glucose is at any time. You can quickly find out if you have low blood glucose (hypoglycemia) or high blood glucose (hyperglycemia).  Your health care provider will set individualized treatment goals for you. Your goals will be based on your age, other medical conditions you have, and how you respond to diabetes treatment. Generally, the goal of treatment is to maintain the following blood glucose levels:  · Before meals (preprandial):  mg/dL (4.4-7.2 mmol/L).  · After meals (postprandial): below 180 mg/dL (10 mmol/L).  · A1c level: less than 7%.  Supplies needed:  · Blood glucose meter.  · Test strips for your meter. Each meter has its own strips. You must use the strips that came with your meter.  · A needle to prick your finger (lancet). Do not use a lancet more than one time.  · A device that holds the lancet (lancing device).  · A journal or log book to write down your results.  How to check your blood glucose    1. Wash your hands with soap and water.  2. Prick the side of your finger (not the tip) with the lancet. Use a different finger each time.  3. Gently rub the finger until a small drop of blood appears.  4. Follow instructions that come with your meter for inserting the test strip, applying blood to the strip, and using your blood glucose meter.  5. Write down your result and any notes.  Some meters  allow you to use areas of your body other than your finger (alternative sites) to test your blood. The most common alternative sites are:  · Forearm.  · Thigh.  · Palm of the hand.  If you think you may have hypoglycemia, or if you have a history of not knowing when your blood glucose is getting low (hypoglycemia unawareness), do not use alternative sites. Use your finger instead. Alternative sites may not be as accurate as the fingers, because blood flow is slower in these areas. This means that the result you get may be delayed, and it may be different from the result that you would get from your finger.  Follow these instructions at home:  Blood glucose log    · Every time you check your blood glucose, write down your result. Also write down any notes about things that may be affecting your blood glucose, such as your diet and exercise for the day. This information can help you and your health care provider:  ? Look for patterns in your blood glucose over time.  ? Adjust your diabetes management plan as needed.  · Check if your meter allows you to download your records to a computer. Most glucose meters store a record of glucose readings in the meter.  If you have type 1 diabetes:  · Check your blood glucose 2 or more times a day.  · Also check your blood glucose:  ? Before every insulin injection.  ? Before and after exercise.  ? Before meals.  ? 2 hours after a meal.  ? Occasionally between 2:00 a.m. and 3:00 a.m., as directed.  ? Before potentially dangerous tasks, like driving or using heavy machinery.  ? At bedtime.  · You may need to check your blood glucose more often, up to 6-10 times a day, if you:  ? Use an insulin pump.  ? Need multiple daily injections (MDI).  ? Have diabetes that is not well-controlled.  ? Are ill.  ? Have a history of severe hypoglycemia.  ? Have hypoglycemia unawareness.  If you have type 2 diabetes:  · If you take insulin or other diabetes medicines, check your blood glucose 2 or  "more times a day.  · If you are on intensive insulin therapy, check your blood glucose 4 or more times a day. Occasionally, you may also need to check between 2:00 a.m. and 3:00 a.m., as directed.  · Also check your blood glucose:  ? Before and after exercise.  ? Before potentially dangerous tasks, like driving or using heavy machinery.  · You may need to check your blood glucose more often if:  ? Your medicine is being adjusted.  ? Your diabetes is not well-controlled.  ? You are ill.  General tips  · Always keep your supplies with you.  · If you have questions or need help, all blood glucose meters have a 24-hour \"hotline\" phone number that you can call. You may also contact your health care provider.  · After you use a few boxes of test strips, adjust (calibrate) your blood glucose meter by following instructions that came with your meter.  Contact a health care provider if:  · Your blood glucose is at or above 240 mg/dL (13.3 mmol/L) for 2 days in a row.  · You have been sick or have had a fever for 2 days or longer, and you are not getting better.  · You have any of the following problems for more than 6 hours:  ? You cannot eat or drink.  ? You have nausea or vomiting.  ? You have diarrhea.  Get help right away if:  · Your blood glucose is lower than 54 mg/dL (3 mmol/L).  · You become confused or you have trouble thinking clearly.  · You have difficulty breathing.  · You have moderate or large ketone levels in your urine.  Summary  · Monitoring your blood sugar (glucose) is an important part of managing your diabetes (diabetes mellitus).  · Blood glucose monitoring involves checking your blood glucose as often as directed and keeping a record (log) of your results over time.  · Your health care provider will set individualized treatment goals for you. Your goals will be based on your age, other medical conditions you have, and how you respond to diabetes treatment.  · Every time you check your blood glucose, " write down your result. Also write down any notes about things that may be affecting your blood glucose, such as your diet and exercise for the day.  This information is not intended to replace advice given to you by your health care provider. Make sure you discuss any questions you have with your health care provider.  Document Released: 12/20/2004 Document Revised: 10/11/2019 Document Reviewed: 05/29/2017  [employer] Patient Education © 2020 [employer]  Hyperglycemia  Hyperglycemia occurs when the level of sugar (glucose) in the blood is too high. Glucose is a type of sugar that provides the body's main source of energy. Certain hormones (insulin and glucagon) control the level of glucose in the blood. Insulin lowers blood glucose, and glucagon increases blood glucose. Hyperglycemia can result from having too little insulin in the bloodstream, or from the body not responding normally to insulin.  Hyperglycemia occurs most often in people who have diabetes (diabetes mellitus), but it can happen in people who do not have diabetes. It can develop quickly, and it can be life-threatening if it causes you to become severely dehydrated (diabetic ketoacidosis or hyperglycemic hyperosmolar state). Severe hyperglycemia is a medical emergency.  What are the causes?  If you have diabetes, hyperglycemia may be caused by:  · Diabetes medicine.  · Medicines that increase blood glucose or affect your diabetes control.  · Not eating enough, or not eating often enough.  · Changes in physical activity level.  · Being sick or having an infection.  If you have prediabetes or undiagnosed diabetes:  · Hyperglycemia may be caused by those conditions.  If you do not have diabetes, hyperglycemia may be caused by:  · Certain medicines, including steroid medicines, beta-blockers, epinephrine, and thiazide diuretics.  · Stress.  · Serious illness.  · Surgery.  · Diseases of the pancreas.  · Infection.  What increases the risk?  Hyperglycemia  is more likely to develop in people who have risk factors for diabetes, such as:  · Having a family member with diabetes.  · Having a gene for type 1 diabetes that is passed from parent to child (inherited).  · Living in an area with cold weather conditions.  · Exposure to certain viruses.  · Certain conditions in which the body's disease-fighting (immune) system attacks itself (autoimmune disorders).  · Being overweight or obese.  · Having an inactive (sedentary) lifestyle.  · Having been diagnosed with insulin resistance.  · Having a history of prediabetes, gestational diabetes, or polycystic ovarian syndrome (PCOS).  · Being of American-[place], -American, /, or / descent.  What are the signs or symptoms?  Hyperglycemia may not cause any symptoms. If you do have symptoms, they may include early warning signs, such as:  · Increased thirst.  · Hunger.  · Feeling very tired.  · Needing to urinate more often than usual.  · Blurry vision.  Other symptoms may develop if hyperglycemia gets worse, such as:  · Dry mouth.  · Loss of appetite.  · Fruity-smelling breath.  · Weakness.  · Unexpected or rapid weight gain or weight loss.  · Tingling or numbness in the hands or feet.  · Headache.  · Skin that does not quickly return to normal after being lightly pinched and released (poor skin turgor).  · Abdominal pain.  · Cuts or bruises that are slow to heal.  How is this diagnosed?  Hyperglycemia is diagnosed with a blood test to measure your blood glucose level. This blood test is usually done while you are having symptoms. Your health care provider may also do a physical exam and review your medical history.  You may have more tests to determine the cause of your hyperglycemia, such as:  · A fasting blood glucose (FBG) test. You will not be allowed to eat (you will fast) for at least 8 hours before a blood sample is taken.  · An A1c (hemoglobin A1c) blood test. This provides  information about blood glucose control over the previous 2-3 months.  · An oral glucose tolerance test (OGTT). This measures your blood glucose at two times:  ? After fasting. This is your baseline blood glucose level.  ? Two hours after drinking a beverage that contains glucose.  How is this treated?  Treatment depends on the cause of your hyperglycemia. Treatment may include:  · Taking medicine to regulate your blood glucose levels. If you take insulin or other diabetes medicines, your medicine or dosage may be adjusted.  · Lifestyle changes, such as exercising more, eating healthier foods, or losing weight.  · Treating an illness or infection, if this caused your hyperglycemia.  · Checking your blood glucose more often.  · Stopping or reducing steroid medicines, if these caused your hyperglycemia.  If your hyperglycemia becomes severe and it results in hyperglycemic hyperosmolar state, you must be hospitalized and given IV fluids.  Follow these instructions at home:    General instructions  · Take over-the-counter and prescription medicines only as told by your health care provider.  · Do not use any products that contain nicotine or tobacco, such as cigarettes and e-cigarettes. If you need help quitting, ask your health care provider.  · Limit alcohol intake to no more than 1 drink per day for nonpregnant women and 2 drinks per day for men. One drink equals 12 oz of beer, 5 oz of wine, or 1½ oz of hard liquor.  · Learn to manage stress. If you need help with this, ask your health care provider.  · Keep all follow-up visits as told by your health care provider. This is important.  Eating and drinking    · Maintain a healthy weight.  · Exercise regularly, as directed by your health care provider.  · Stay hydrated, especially when you exercise, get sick, or spend time in hot temperatures.  · Eat healthy foods, such as:  ? Lean proteins.  ? Complex carbohydrates.  ? Fresh fruits and vegetables.  ? Low-fat dairy  products.  ? Healthy fats.  · Drink enough fluid to keep your urine clear or pale yellow.  If you have diabetes:  · Make sure you know the symptoms of hyperglycemia.  · Follow your diabetes management plan, as told by your health care provider. Make sure you:  ? Take your insulin and medicines as directed.  ? Follow your exercise plan.  ? Follow your meal plan. Eat on time, and do not skip meals.  ? Check your blood glucose as often as directed. Make sure to check your blood glucose before and after exercise. If you exercise longer or in a different way than usual, check your blood glucose more often.  ? Follow your sick day plan whenever you cannot eat or drink normally. Make this plan in advance with your health care provider.  · Share your diabetes management plan with people in your workplace, school, and household.  · Check your urine for ketones when you are ill and as told by your health care provider.  · Carry a medical alert card or wear medical alert jewelry.  Contact a health care provider if:  · Your blood glucose is at or above 240 mg/dL (13.3 mmol/L) for 2 days in a row.  · You have problems keeping your blood glucose in your target range.  · You have frequent episodes of hyperglycemia.  Get help right away if:  · You have difficulty breathing.  · You have a change in how you think, feel, or act (mental status).  · You have nausea or vomiting that does not go away.  These symptoms may represent a serious problem that is an emergency. Do not wait to see if the symptoms will go away. Get medical help right away. Call your local emergency services (911 in the [place] Do not drive yourself to the hospital.  Summary  · Hyperglycemia occurs when the level of sugar (glucose) in the blood is too high.  · Hyperglycemia is diagnosed with a blood test to measure your blood glucose level. This blood test is usually done while you are having symptoms. Your health care provider may also do a physical exam and review  your medical history.  · If you have diabetes, follow your diabetes management plan as told by your health care provider.  · Contact your health care provider if you have problems keeping your blood glucose in your target range.  This information is not intended to replace advice given to you by your health care provider. Make sure you discuss any questions you have with your health care provider.  Document Released: 06/13/2002 Document Revised: 09/04/2017 Document Reviewed: 09/04/2017  [employer] Patient Education © 2020 [employer]  Diabetes Mellitus and Nutrition, Adult  When you have diabetes (diabetes mellitus), it is very important to have healthy eating habits because your blood sugar (glucose) levels are greatly affected by what you eat and drink. Eating healthy foods in the appropriate amounts, at about the same times every day, can help you:  · Control your blood glucose.  · Lower your risk of heart disease.  · Improve your blood pressure.  · Reach or maintain a healthy weight.  Every person with diabetes is different, and each person has different needs for a meal plan. Your health care provider may recommend that you work with a diet and nutrition specialist (dietitian) to make a meal plan that is best for you. Your meal plan may vary depending on factors such as:  · The calories you need.  · The medicines you take.  · Your weight.  · Your blood glucose, blood pressure, and cholesterol levels.  · Your activity level.  · Other health conditions you have, such as heart or kidney disease.  How do carbohydrates affect me?  Carbohydrates, also called carbs, affect your blood glucose level more than any other type of food. Eating carbs naturally raises the amount of glucose in your blood. Carb counting is a method for keeping track of how many carbs you eat. Counting carbs is important to keep your blood glucose at a healthy level, especially if you use insulin or take certain oral diabetes medicines.  It is  "important to know how many carbs you can safely have in each meal. This is different for every person. Your dietitian can help you calculate how many carbs you should have at each meal and for each snack.  Foods that contain carbs include:  · Bread, cereal, rice, pasta, and crackers.  · Potatoes and corn.  · Peas, beans, and lentils.  · Milk and yogurt.  · Fruit and juice.  · Desserts, such as cakes, cookies, ice cream, and candy.  How does alcohol affect me?  Alcohol can cause a sudden decrease in blood glucose (hypoglycemia), especially if you use insulin or take certain oral diabetes medicines. Hypoglycemia can be a life-threatening condition. Symptoms of hypoglycemia (sleepiness, dizziness, and confusion) are similar to symptoms of having too much alcohol.  If your health care provider says that alcohol is safe for you, follow these guidelines:  · Limit alcohol intake to no more than 1 drink per day for nonpregnant women and 2 drinks per day for men. One drink equals 12 oz of beer, 5 oz of wine, or 1½ oz of hard liquor.  · Do not drink on an empty stomach.  · Keep yourself hydrated with water, diet soda, or unsweetened iced tea.  · Keep in mind that regular soda, juice, and other mixers may contain a lot of sugar and must be counted as carbs.  What are tips for following this plan?    Reading food labels  · Start by checking the serving size on the \"Nutrition Facts\" label of packaged foods and drinks. The amount of calories, carbs, fats, and other nutrients listed on the label is based on one serving of the item. Many items contain more than one serving per package.  · Check the total grams (g) of carbs in one serving. You can calculate the number of servings of carbs in one serving by dividing the total carbs by 15. For example, if a food has 30 g of total carbs, it would be equal to 2 servings of carbs.  · Check the number of grams (g) of saturated and trans fats in one serving. Choose foods that have low or " "no amount of these fats.  · Check the number of milligrams (mg) of salt (sodium) in one serving. Most people should limit total sodium intake to less than 2,300 mg per day.  · Always check the nutrition information of foods labeled as \"low-fat\" or \"nonfat\". These foods may be higher in added sugar or refined carbs and should be avoided.  · Talk to your dietitian to identify your daily goals for nutrients listed on the label.  Shopping  · Avoid buying canned, premade, or processed foods. These foods tend to be high in fat, sodium, and added sugar.  · Shop around the outside edge of the grocery store. This includes fresh fruits and vegetables, bulk grains, fresh meats, and fresh dairy.  Cooking  · Use low-heat cooking methods, such as baking, instead of high-heat cooking methods like deep frying.  · Cook using healthy oils, such as olive, canola, or sunflower oil.  · Avoid cooking with butter, cream, or high-fat meats.  Meal planning  · Eat meals and snacks regularly, preferably at the same times every day. Avoid going long periods of time without eating.  · Eat foods high in fiber, such as fresh fruits, vegetables, beans, and whole grains. Talk to your dietitian about how many servings of carbs you can eat at each meal.  · Eat 4-6 ounces (oz) of lean protein each day, such as lean meat, chicken, fish, eggs, or tofu. One oz of lean protein is equal to:  ? 1 oz of meat, chicken, or fish.  ? 1 egg.  ? ¼ cup of tofu.  · Eat some foods each day that contain healthy fats, such as avocado, nuts, seeds, and fish.  Lifestyle  · Check your blood glucose regularly.  · Exercise regularly as told by your health care provider. This may include:  ? 150 minutes of moderate-intensity or vigorous-intensity exercise each week. This could be brisk walking, biking, or water aerobics.  ? Stretching and doing strength exercises, such as yoga or weightlifting, at least 2 times a week.  · Take medicines as told by your health care " provider.  · Do not use any products that contain nicotine or tobacco, such as cigarettes and e-cigarettes. If you need help quitting, ask your health care provider.  · Work with a counselor or diabetes educator to identify strategies to manage stress and any emotional and social challenges.  Questions to ask a health care provider  · Do I need to meet with a diabetes educator?  · Do I need to meet with a dietitian?  · What number can I call if I have questions?  · When are the best times to check my blood glucose?  Where to find more information:  · American Diabetes Association: diabetes.org  · Academy of Nutrition and Dietetics: www.eatright.org  · National [place] of Diabetes and Digestive and Kidney Diseases (NIH): www.niddk.nih.gov  Summary  · A healthy meal plan will help you control your blood glucose and maintain a healthy lifestyle.  · Working with a diet and nutrition specialist (dietitian) can help you make a meal plan that is best for you.  · Keep in mind that carbohydrates (carbs) and alcohol have immediate effects on your blood glucose levels. It is important to count carbs and to use alcohol carefully.  This information is not intended to replace advice given to you by your health care provider. Make sure you discuss any questions you have with your health care provider.  Document Released: 09/14/2006 Document Revised: 11/30/2018 Document Reviewed: 01/22/2018  [employer] Patient Education © 2020 [employer]  Diabetes Basics    Diabetes (diabetes mellitus) is a long-term (chronic) disease. It occurs when the body does not properly use sugar (glucose) that is released from food after you eat.  Diabetes may be caused by one or both of these problems:  · Your pancreas does not make enough of a hormone called insulin.  · Your body does not react in a normal way to insulin that it makes.  Insulin lets sugars (glucose) go into cells in your body. This gives you energy. If you have diabetes, sugars cannot  get into cells. This causes high blood sugar (hyperglycemia).  Follow these instructions at home:  How is diabetes treated?  You may need to take insulin or other diabetes medicines daily to keep your blood sugar in balance. Take your diabetes medicines every day as told by your doctor. List your diabetes medicines here:  Diabetes medicines  · Name of medicine: ______________________________  ? Amount (dose): _______________ Time (a.m./p.m.): _______________ Notes: ___________________________________  · Name of medicine: ______________________________  ? Amount (dose): _______________ Time (a.m./p.m.): _______________ Notes: ___________________________________  · Name of medicine: ______________________________  ? Amount (dose): _______________ Time (a.m./p.m.): _______________ Notes: ___________________________________  If you use insulin, you will learn how to give yourself insulin by injection. You may need to adjust the amount based on the food that you eat. List the types of insulin you use here:  Insulin  · Insulin type: ______________________________  ? Amount (dose): _______________ Time (a.m./p.m.): _______________ Notes: ___________________________________  · Insulin type: ______________________________  ? Amount (dose): _______________ Time (a.m./p.m.): _______________ Notes: ___________________________________  · Insulin type: ______________________________  ? Amount (dose): _______________ Time (a.m./p.m.): _______________ Notes: ___________________________________  · Insulin type: ______________________________  ? Amount (dose): _______________ Time (a.m./p.m.): _______________ Notes: ___________________________________  · Insulin type: ______________________________  ? Amount (dose): _______________ Time (a.m./p.m.): _______________ Notes: ___________________________________  How do I manage my blood sugar?    Check your blood sugar levels using a blood glucose monitor as directed by your  doctor.  Your doctor will set treatment goals for you. Generally, you should have these blood sugar levels:  · Before meals (preprandial):  mg/dL (4.4-7.2 mmol/L).  · After meals (postprandial): below 180 mg/dL (10 mmol/L).  · A1c level: less than 7%.  Write down the times that you will check your blood sugar levels:  Blood sugar checks  · Time: _______________ Notes: ___________________________________  · Time: _______________ Notes: ___________________________________  · Time: _______________ Notes: ___________________________________  · Time: _______________ Notes: ___________________________________  · Time: _______________ Notes: ___________________________________  · Time: _______________ Notes: ___________________________________    What do I need to know about low blood sugar?  Low blood sugar is called hypoglycemia. This is when blood sugar is at or below 70 mg/dL (3.9 mmol/L). Symptoms may include:  · Feeling:  ? Hungry.  ? Worried or nervous (anxious).  ? Sweaty and clammy.  ? Confused.  ? Dizzy.  ? Sleepy.  ? Sick to your stomach (nauseous).  · Having:  ? A fast heartbeat.  ? A headache.  ? A change in your vision.  ? Tingling or no feeling (numbness) around the mouth, lips, or tongue.  ? Jerky movements that you cannot control (seizure).  · Having trouble with:  ? Moving (coordination).  ? Sleeping.  ? Passing out (fainting).  ? Getting upset easily (irritability).  Treating low blood sugar  To treat low blood sugar, eat or drink something sugary right away. If you can think clearly and swallow safely, follow the 15:15 rule:  · Take 15 grams of a fast-acting carb (carbohydrate). Talk with your doctor about how much you should take.  · Some fast-acting carbs are:  ? Sugar tablets (glucose pills). Take 3-4 glucose pills.  ? 6-8 pieces of hard candy.  ? 4-6 oz (120-150 mL) of fruit juice.  ? 4-6 oz (120-150 mL) of regular (not diet) soda.  ? 1 Tbsp (15 mL) honey or sugar.  · Check your blood sugar 15  minutes after you take the carb.  · If your blood sugar is still at or below 70 mg/dL (3.9 mmol/L), take 15 grams of a carb again.  · If your blood sugar does not go above 70 mg/dL (3.9 mmol/L) after 3 tries, get help right away.  · After your blood sugar goes back to normal, eat a meal or a snack within 1 hour.  Treating very low blood sugar  If your blood sugar is at or below 54 mg/dL (3 mmol/L), you have very low blood sugar (severe hypoglycemia). This is an emergency. Do not wait to see if the symptoms will go away. Get medical help right away. Call your local emergency services (911 in the [place] Do not drive yourself to the hospital.  Questions to ask your health care provider  · Do I need to meet with a diabetes educator?  · What equipment will I need to care for myself at home?  · What diabetes medicines do I need? When should I take them?  · How often do I need to check my blood sugar?  · What number can I call if I have questions?  · When is my next doctor's visit?  · Where can I find a support group for people with diabetes?  Where to find more information  · American Diabetes Association: www.diabetes.org  · American Association of Diabetes Educators: www.diabeteseducator.org/patient-resources  Contact a doctor if:  · Your blood sugar is at or above 240 mg/dL (13.3 mmol/L) for 2 days in a row.  · You have been sick or have had a fever for 2 days or more, and you are not getting better.  · You have any of these problems for more than 6 hours:  ? You cannot eat or drink.  ? You feel sick to your stomach (nauseous).  ? You throw up (vomit).  ? You have watery poop (diarrhea).  Get help right away if:  · Your blood sugar is lower than 54 mg/dL (3 mmol/L).  · You get confused.  · You have trouble:  ? Thinking clearly.  ? Breathing.  Summary  · Diabetes (diabetes mellitus) is a long-term (chronic) disease. It occurs when the body does not properly use sugar (glucose) that is released from food after  digestion.  · Take insulin and diabetes medicines as told.  · Check your blood sugar every day, as often as told.  · Keep all follow-up visits as told by your doctor. This is important.  This information is not intended to replace advice given to you by your health care provider. Make sure you discuss any questions you have with your health care provider.  Document Released: 03/22/2019 Document Revised: 02/07/2020 Document Reviewed: 03/22/2019  [employer] Patient Education © 2020 [employer]  Discharge Instructions    Discharged to home by car with relative. Discharged via wheelchair, hospital escort: Yes.  Special equipment needed: Not Applicable    Be sure to schedule a follow-up appointment with your primary care doctor or any specialists as instructed.     Discharge Plan:   Diet Plan: Discussed  Activity Level: Discussed  Confirmed Follow up Appointment: Patient to Call and Schedule Appointment  Confirmed Symptoms Management: Discussed  Medication Reconciliation Updated: Yes    I understand that a diet low in cholesterol, fat, and sodium is recommended for good health. Unless I have been given specific instructions below for another diet, I accept this instruction as my diet prescription.   Other diet: Diabetic diet    Special Instructions: None    Is patient discharged on Warfarin / Coumadin?   No     Depression / Suicide Risk    As you are discharged from this [facility] facility, it is important to learn how to keep safe from harming yourself.    Recognize the warning signs:  Abrupt changes in personality, positive or negative- including increase in energy   Giving away possessions  Change in eating patterns- significant weight changes-  positive or negative  Change in sleeping patterns- unable to sleep or sleeping all the time   Unwillingness or inability to communicate  Depression  Unusual sadness, discouragement and loneliness  Talk of wanting to die  Neglect of personal appearance   Rebelliousness-  reckless behavior  Withdrawal from people/activities they love  Confusion- inability to concentrate     If you or a loved one observes any of these behaviors or has concerns about self-harm, here's what you can do:  Talk about it- your feelings and reasons for harming yourself  Remove any means that you might use to hurt yourself (examples: pills, rope, extension cords, firearm)  Get professional help from the community (Mental Health, Substance Abuse, psychological counseling)  Do not be alone:Call your Safe Contact- someone whom you trust who will be there for you.  Call your local CRISIS HOTLINE 935-5228 or 463-225-0504  Call your local Children's Mobile Crisis Response Team [place] (727) 093-8522 or www.23press  Call the toll free National Suicide Prevention Hotlines   National Suicide Prevention Lifeline 829-212-REPV (1926)  National Hope Line Network 800-SUICIDE (307-4400)

## 2022-04-15 ENCOUNTER — HOSPITAL ENCOUNTER (OUTPATIENT)
Dept: LAB | Facility: MEDICAL CENTER | Age: 42
End: 2022-04-15
Attending: FAMILY MEDICINE
Payer: COMMERCIAL

## 2022-04-15 LAB
ALBUMIN SERPL BCP-MCNC: 4.5 G/DL (ref 3.2–4.9)
ALBUMIN/GLOB SERPL: 1.7 G/DL
ALP SERPL-CCNC: 63 U/L (ref 30–99)
ALT SERPL-CCNC: 51 U/L (ref 2–50)
ANION GAP SERPL CALC-SCNC: 13 MMOL/L (ref 7–16)
AST SERPL-CCNC: 31 U/L (ref 12–45)
BASOPHILS # BLD AUTO: 0.7 % (ref 0–1.8)
BASOPHILS # BLD: 0.07 K/UL (ref 0–0.12)
BILIRUB SERPL-MCNC: 1 MG/DL (ref 0.1–1.5)
BUN SERPL-MCNC: 18 MG/DL (ref 8–22)
CALCIUM SERPL-MCNC: 9.4 MG/DL (ref 8.5–10.5)
CHLORIDE SERPL-SCNC: 101 MMOL/L (ref 96–112)
CHOLEST SERPL-MCNC: 192 MG/DL (ref 100–199)
CO2 SERPL-SCNC: 23 MMOL/L (ref 20–33)
CREAT SERPL-MCNC: 1.42 MG/DL (ref 0.5–1.4)
CREAT UR-MCNC: 108.44 MG/DL
EOSINOPHIL # BLD AUTO: 0.18 K/UL (ref 0–0.51)
EOSINOPHIL NFR BLD: 1.9 % (ref 0–6.9)
ERYTHROCYTE [DISTWIDTH] IN BLOOD BY AUTOMATED COUNT: 42.5 FL (ref 35.9–50)
EST. AVERAGE GLUCOSE BLD GHB EST-MCNC: 237 MG/DL
FASTING STATUS PATIENT QL REPORTED: NORMAL
GFR SERPLBLD CREATININE-BSD FMLA CKD-EPI: 63 ML/MIN/1.73 M 2
GLOBULIN SER CALC-MCNC: 2.6 G/DL (ref 1.9–3.5)
GLUCOSE SERPL-MCNC: 149 MG/DL (ref 65–99)
HBA1C MFR BLD: 9.9 % (ref 4–5.6)
HCT VFR BLD AUTO: 54.3 % (ref 42–52)
HDLC SERPL-MCNC: 25 MG/DL
HGB BLD-MCNC: 18.5 G/DL (ref 14–18)
IMM GRANULOCYTES # BLD AUTO: 0.05 K/UL (ref 0–0.11)
IMM GRANULOCYTES NFR BLD AUTO: 0.5 % (ref 0–0.9)
LDLC SERPL CALC-MCNC: 97 MG/DL
LYMPHOCYTES # BLD AUTO: 2.65 K/UL (ref 1–4.8)
LYMPHOCYTES NFR BLD: 28.3 % (ref 22–41)
MCH RBC QN AUTO: 30 PG (ref 27–33)
MCHC RBC AUTO-ENTMCNC: 34.1 G/DL (ref 33.7–35.3)
MCV RBC AUTO: 88 FL (ref 81.4–97.8)
MICROALBUMIN UR-MCNC: 49.4 MG/DL
MICROALBUMIN/CREAT UR: 456 MG/G (ref 0–30)
MONOCYTES # BLD AUTO: 0.64 K/UL (ref 0–0.85)
MONOCYTES NFR BLD AUTO: 6.8 % (ref 0–13.4)
NEUTROPHILS # BLD AUTO: 5.76 K/UL (ref 1.82–7.42)
NEUTROPHILS NFR BLD: 61.8 % (ref 44–72)
NRBC # BLD AUTO: 0 K/UL
NRBC BLD-RTO: 0 /100 WBC
PLATELET # BLD AUTO: 266 K/UL (ref 164–446)
PMV BLD AUTO: 10.4 FL (ref 9–12.9)
POTASSIUM SERPL-SCNC: 4.1 MMOL/L (ref 3.6–5.5)
PROT SERPL-MCNC: 7.1 G/DL (ref 6–8.2)
RBC # BLD AUTO: 6.17 M/UL (ref 4.7–6.1)
SODIUM SERPL-SCNC: 137 MMOL/L (ref 135–145)
TRIGL SERPL-MCNC: 351 MG/DL (ref 0–149)
WBC # BLD AUTO: 9.4 K/UL (ref 4.8–10.8)

## 2022-04-15 PROCEDURE — 80061 LIPID PANEL: CPT

## 2022-04-15 PROCEDURE — 82043 UR ALBUMIN QUANTITATIVE: CPT

## 2022-04-15 PROCEDURE — 83036 HEMOGLOBIN GLYCOSYLATED A1C: CPT

## 2022-04-15 PROCEDURE — 80053 COMPREHEN METABOLIC PANEL: CPT

## 2022-04-15 PROCEDURE — 82570 ASSAY OF URINE CREATININE: CPT

## 2022-04-15 PROCEDURE — 85025 COMPLETE CBC W/AUTO DIFF WBC: CPT

## 2022-04-15 PROCEDURE — 36415 COLL VENOUS BLD VENIPUNCTURE: CPT

## 2022-07-06 ENCOUNTER — OFFICE VISIT (OUTPATIENT)
Dept: MEDICAL GROUP | Facility: OTHER | Age: 42
End: 2022-07-06
Payer: COMMERCIAL

## 2022-07-06 VITALS
WEIGHT: 286 LBS | SYSTOLIC BLOOD PRESSURE: 120 MMHG | DIASTOLIC BLOOD PRESSURE: 70 MMHG | BODY MASS INDEX: 36.7 KG/M2 | HEIGHT: 74 IN | HEART RATE: 112 BPM | TEMPERATURE: 97.5 F | RESPIRATION RATE: 15 BRPM | OXYGEN SATURATION: 92 %

## 2022-07-06 DIAGNOSIS — M25.511 ACUTE PAIN OF RIGHT SHOULDER: ICD-10-CM

## 2022-07-06 PROCEDURE — 99203 OFFICE O/P NEW LOW 30 MIN: CPT | Performed by: FAMILY MEDICINE

## 2022-07-06 RX ORDER — VALSARTAN AND HYDROCHLOROTHIAZIDE 160; 25 MG/1; MG/1
1 TABLET ORAL DAILY
COMMUNITY
Start: 2022-05-08

## 2022-07-06 RX ORDER — EMPAGLIFLOZIN 10 MG/1
TABLET, FILM COATED ORAL
COMMUNITY
Start: 2022-04-19 | End: 2022-07-06

## 2022-07-06 RX ORDER — BLOOD SUGAR DIAGNOSTIC
STRIP MISCELLANEOUS
COMMUNITY
Start: 2022-04-12

## 2022-07-06 RX ORDER — EMPAGLIFLOZIN 25 MG/1
TABLET, FILM COATED ORAL
COMMUNITY
Start: 2022-06-21

## 2022-07-06 RX ORDER — ALLOPURINOL 300 MG/1
300 TABLET ORAL DAILY
COMMUNITY
Start: 2022-05-08

## 2022-07-06 RX ORDER — METFORMIN HYDROCHLORIDE 500 MG/1
TABLET, EXTENDED RELEASE ORAL
COMMUNITY
Start: 2022-04-18

## 2022-07-06 ASSESSMENT — FIBROSIS 4 INDEX: FIB4 SCORE: 0.69

## 2022-07-06 ASSESSMENT — PAIN SCALES - GENERAL: PAINLEVEL: 8=MODERATE-SEVERE PAIN

## 2022-07-06 NOTE — PATIENT INSTRUCTIONS
Acute work injury of right shoulder --- cannot exclude ligamentous injury or labral tear.      X-rays ordered at outside facility.    Tylenol Arthritis 650 mg prn pain.     Physical therapy ordered.     Recheck one week.    C4 form filled out.      Letter written for employer.

## 2022-07-06 NOTE — PROGRESS NOTES
[facility]     PATIENT ID:  NAME:  Ace Mcneill  MRN:               [id number]  YOB: 1980    Date: 11:06 AM    WORK INJURY    CC:  Right shoulder injury at work      HPI: Ace Mcneill is a 42 y.o. male who presented with acute work injury during training last week.     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  He did a standing front roll and felt a pop in his right shoulder and had a sharp pain immediately.  Has pain at night and is unable to sleep on it.  Has pain with elevating his arm over his head.  Took some Tylenol which didn't help much.  .      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                PROBLEM LIST  Patient Active Problem List   Diagnosis   • CKD (chronic kidney disease), stage III (HCC)   • HTN (hypertension)   • Albuminuria   • Kidney donor   • Vitamin D deficiency disease   • Hyperglycemia   • Pseudo-hyponatremia   • GERD (gastroesophageal reflux disease)   • Essential hypertension   • Gout   • Dehydration   • Blurry vision   • Solitary kidney   • Acute pain of right shoulder        PAST SURGICAL HISTORY:  Past Surgical History:   Procedure Laterality Date   • ORIF, FOOT  8/4/2009    Performed by NHUNG CHAVEZ at SURGERY [facility] ORS   • CRANIOPLASTY  1999   • PB FUSION OF KNEE  1996    right    • INGUINAL HERNIA REPAIR CHILD  1981    left        FAMILY HISTORY:  Family History   Problem Relation Age of Onset   • Heart Disease Maternal Aunt        SOCIAL HISTORY:   Social History     Tobacco Use   • Smoking status: Former Smoker     Packs/day: 0.00   • Smokeless tobacco: Current User   Substance Use Topics   • Alcohol use: No       ALLERGIES:  Allergies   Allergen Reactions   • Lisinopril Cough       OUTPATIENT  "MEDICATIONS:    Current Outpatient Medications:   •  metFORMIN ER (GLUCOPHAGE XR) 500 MG TABLET SR 24 HR, , Disp: , Rfl:   •  ONETOUCH ULTRA strip, USE TWICE DAILY, Disp: , Rfl:   •  JARDIANCE 25 MG Tab, , Disp: , Rfl:   •  valsartan-hydrochlorothiazide (DIOVAN-HCT) 160-25 MG per tablet, Take 1 Tablet by mouth every day., Disp: , Rfl:   •  insulin glargine (LANTUS SOLOSTAR) 100 UNIT/ML Solution Pen-injector injection, Inject 20 Units under the skin 2 times a day., Disp: 1 Each, Rfl: 1  •  insulin lispro (HUMALOG,ADMELOG) 100 UNIT/ML Solution Pen-injector injection PEN, 70   - 150  mg/dL =      0 Units 151 - 200  mg/dL =    3 Units 201 - 250  mg/dL =    4 Units 251 - 300  mg/dL  =   7 Units 301 - 350  mg/dL  =   10 Units 351 - 400 mg/dL   =   12 Units Over 400 mg/dL   =   14 Units, Disp: 1 Each, Rfl: 1  •  Blood Glucose Meter Kit, Test blood sugar as recommended by provider. One Touch Ultra 2 blood glucose monitoring kit., Disp: 1 Kit, Rfl: 0  •  Blood Glucose Test Strips, Use one One Touch Ultra 2 strip to test blood sugar three times daily before meals., Disp: 100 Strip, Rfl: 0  •  Lancets, Use one One Touch Ultra 2 lancet to test blood sugar three times daily before meals., Disp: 100 Each, Rfl: 0  •  Alcohol Swabs, Wipe site with prep pad prior to injection., Disp: 100 Each, Rfl: 0  •  Insulin Pen Needle 32 G x 4 mm, Use one pen needle in pen device to inject insulin five times daily., Disp: 100 Each, Rfl: 0  •  PRILOSEC 20 MG CPDR, Take  by mouth every day., Disp: , Rfl:   •  allopurinol (ZYLOPRIM) 300 MG Tab, Take 300 mg by mouth every day., Disp: , Rfl:   •  ALLOPURINOL 100 MG PO TABS, Take 100 mg by mouth every evening., Disp: , Rfl:     PHYSICAL EXAM:  Vitals:    07/06/22 1003   BP: 120/70   BP Location: Right arm   Patient Position: Sitting   BP Cuff Size: Adult long   Pulse: (!) 112   Resp: 15   Temp: 36.4 °C (97.5 °F)   TempSrc: Temporal   SpO2: 92%   Weight: (!) 130 kg (286 lb)   Height: 1.88 m (6' 2\") "       General: Pt resting in NAD, cooperative   Skin:  Pink, warm and dry.  Musculoskeletal: Has tenderness to palpation of right shoulder joint laterally. Has good strength of rotator cuff tendons. No crepitus on PROM. Positive empty can test. Can raise arm over head easily.   Extremities:  Full range of motion.  CNS:  Muscle tone is normal. No gross focal neurologic deficits      ASSESSMENT/PLAN:   42 y.o. male     Problem List Items Addressed This Visit     Acute pain of right shoulder     Acute work injury of right shoulder --- cannot exclude ligamentous injury or labral tear.    X-rays ordered at outside facility.  Tylenol Arthritis 650 mg prn pain.   Physical therapy ordered.   Recheck one week.  C4 form filled out.    Letter written for employer.              Relevant Orders    Referral to Physical Therapy    DX-SHOULDER 2+ RIGHT          Martin Bronson MD  [facility]       Addendum, X-ray results from 7/7/22:  IMPRESSION:  Normal right shoulder radiographs.    -CARTER Bronson MD

## 2022-07-06 NOTE — LETTER
July 6, 2022       Patient: Ace Mcneill   YOB: 1980   Date of Visit: 7/6/2022         To Whom It May Concern:    In my medical opinion, I recommend that Ace Mcneill return to full duty, no restrictions.    If you have any questions or concerns, please don't hesitate to call [phone]          Sincerely,          Martin Bronson M.D.  Electronically Signed

## 2022-07-07 ENCOUNTER — HOSPITAL ENCOUNTER (OUTPATIENT)
Dept: RADIOLOGY | Facility: MEDICAL CENTER | Age: 42
End: 2022-07-07
Attending: FAMILY MEDICINE
Payer: COMMERCIAL

## 2022-07-07 DIAGNOSIS — M25.511 ACUTE PAIN OF RIGHT SHOULDER: ICD-10-CM

## 2022-07-07 PROCEDURE — 73030 X-RAY EXAM OF SHOULDER: CPT | Mod: RT

## 2022-07-14 ENCOUNTER — OCCUPATIONAL MEDICINE (OUTPATIENT)
Dept: OCCUPATIONAL MEDICINE | Facility: OTHER | Age: 42
End: 2022-07-14
Payer: COMMERCIAL

## 2022-07-14 VITALS
BODY MASS INDEX: 37.73 KG/M2 | OXYGEN SATURATION: 92 % | SYSTOLIC BLOOD PRESSURE: 118 MMHG | DIASTOLIC BLOOD PRESSURE: 78 MMHG | TEMPERATURE: 97.8 F | HEART RATE: 99 BPM | WEIGHT: 294 LBS | HEIGHT: 74 IN

## 2022-07-14 DIAGNOSIS — S46.911D STRAIN OF RIGHT SHOULDER, SUBSEQUENT ENCOUNTER: ICD-10-CM

## 2022-07-14 PROCEDURE — 99213 OFFICE O/P EST LOW 20 MIN: CPT | Performed by: FAMILY MEDICINE

## 2022-07-14 ASSESSMENT — FIBROSIS 4 INDEX: FIB4 SCORE: 0.69

## 2022-07-14 ASSESSMENT — ENCOUNTER SYMPTOMS: CONSTITUTIONAL NEGATIVE: 1

## 2022-07-14 NOTE — LETTER
2022    Patient: Ace Mcneill  : 1980  Provider: Asa Hewitt M.D.    RETURN TO WORK    BODY PART: RIGHT SHOULDER  EMPLOYER: [employer]  DOI: 22    It is the injured worker's responsibility to inform the employer of current work status.    CURRENT RESTRICTIONS: FULL DUTY     CONDITION STABLE: NO  CONDITION RATABLE: NO    RETURN VISIT: No follow-ups on file.    Electronically Signed: Asa Hewitt M.D.

## 2022-07-14 NOTE — PROGRESS NOTES
" Subjective:   Ace Mcneill is a 42 y.o. male here for the evaluation and management of Follow-Up (Follow up W/C right shoulder)    Right shoulder strain- patient reports gradual improvement with conservative care, pain located in posterior aspect of the shoulder, no significant radiation, worse with certain motions but overall he feels like he is improving.  No problems updated.    Review of Systems   Constitutional: Negative.    Musculoskeletal: Positive for joint pain.      Objective:     Vitals:    07/14/22 1509   BP: 118/78   BP Location: Right arm   Patient Position: Sitting   Pulse: 99   Temp: 36.6 °C (97.8 °F)   SpO2: 92%   Weight: (!) 133 kg (294 lb)   Height: 1.88 m (6' 2\")     Body mass index is 37.75 kg/m².     Physical Exam  Constitutional:       Appearance: Normal appearance.   HENT:      Head: Normocephalic.   Eyes:      Extraocular Movements: Extraocular movements intact.      Conjunctiva/sclera: Conjunctivae normal.   Neurological:      Mental Status: He is alert. Mental status is at baseline.   Psychiatric:         Mood and Affect: Mood normal.         Behavior: Behavior normal.       Left shoulder-normal    Right shoulder-normal inspection, tender to palpation posterior to the acromioclavicular joint, he has no significant tenderness to palpation over the acromioclavicular joint but there is in the area of the supraspinatus located posterior to the AC joint, full painless range of motion with normal strength and sensation, slight reproduction of pain when testing supraspinatus, no apprehension with impingement signs mildly positive  Assessment and Plan:   Ace Mcneill is a 42 y.o. male with a Follow-Up (Follow up W/C right shoulder)     Imaging reviewed with benign findings, conservative care recommended with physical therapy, he is not a candidate for anti-inflammatories based on his medical history of kidney insufficiency, close follow-up in 2 weeks    Problem List Items " Addressed This Visit    None         Followup: Return in about 2 weeks (around 7/28/2022).    Asa Hewitt M.D.    Please note that this dictation was created using voice recognition software. I have made every reasonable attempt to correct obvious errors, but I expect that there are errors of grammar and possibly content that I did not discover before finalizing the note.

## 2022-07-27 ENCOUNTER — OCCUPATIONAL MEDICINE (OUTPATIENT)
Dept: OCCUPATIONAL MEDICINE | Facility: OTHER | Age: 42
End: 2022-07-27
Payer: COMMERCIAL

## 2022-07-27 VITALS
HEIGHT: 74 IN | BODY MASS INDEX: 37.73 KG/M2 | SYSTOLIC BLOOD PRESSURE: 115 MMHG | DIASTOLIC BLOOD PRESSURE: 70 MMHG | WEIGHT: 294 LBS | HEART RATE: 107 BPM | OXYGEN SATURATION: 96 % | TEMPERATURE: 96.3 F | RESPIRATION RATE: 15 BRPM

## 2022-07-27 DIAGNOSIS — S46.911D STRAIN OF RIGHT SHOULDER, SUBSEQUENT ENCOUNTER: ICD-10-CM

## 2022-07-27 PROCEDURE — 99213 OFFICE O/P EST LOW 20 MIN: CPT | Performed by: FAMILY MEDICINE

## 2022-07-27 ASSESSMENT — FIBROSIS 4 INDEX: FIB4 SCORE: 0.69

## 2022-07-27 ASSESSMENT — ENCOUNTER SYMPTOMS: CONSTITUTIONAL NEGATIVE: 1

## 2022-07-27 NOTE — LETTER
2022    Patient: Ace Mcneill  : 1980  Provider: Asa Hewitt M.D.    RETURN TO WORK    BODY PART: RIGHT SHOULDER  EMPLOYER: [employer]  DOI: 22    It is the injured worker's responsibility to inform the employer of current work status.    CURRENT RESTRICTIONS: FULL DUTY    CONDITION STABLE: NO  CONDITION RATABLE: NO    RETURN VISIT: Return in about 4 weeks (around 2022).    Electronically Signed: Asa Hewitt M.D.

## 2022-07-28 NOTE — PROGRESS NOTES
" Subjective:   Ace Mcneill is a 42 y.o. male here for the evaluation and management of Follow-Up (W/C RT SHOULDER )    Right shoulder strain-modest improvement with conservative care including home exercise program, pain is rated 2 out of 10 in intensity located over the superior posterior aspect of the shoulder, nonradiating, no associated symptoms    No problems updated.    Review of Systems   Constitutional: Negative.    Musculoskeletal: Positive for joint pain.      Objective:     Vitals:    07/27/22 1630   BP: 115/70   BP Location: Right arm   Patient Position: Sitting   BP Cuff Size: Adult   Pulse: (!) 107   Resp: 15   Temp: (!) 35.7 °C (96.3 °F)   TempSrc: Temporal   SpO2: 96%   Weight: (!) 133 kg (294 lb)   Height: 1.88 m (6' 2\")     Body mass index is 37.75 kg/m².     Physical Exam  Constitutional:       Appearance: Normal appearance.   HENT:      Head: Normocephalic.   Eyes:      Extraocular Movements: Extraocular movements intact.      Conjunctiva/sclera: Conjunctivae normal.   Neurological:      Mental Status: He is alert. Mental status is at baseline.   Psychiatric:         Mood and Affect: Mood normal.         Behavior: Behavior normal.       Right shoulder-normal inspection, mild tenderness to palp patient just posterior to the AC joint, full painless range of motion with normal strength and sensation, mild reproduction of pain testing supraspinatus,  Assessment and Plan:   Ace Mcneill is a 42 y.o. male with a Follow-Up (W/C RT SHOULDER )     Modest clinical improvement with conservative management, pending physical therapy, follow-up in 4 weeks    Problem List Items Addressed This Visit    None         Followup: Return in about 4 weeks (around 8/24/2022).    Asa Hewitt M.D.    Please note that this dictation was created using voice recognition software. I have made every reasonable attempt to correct obvious errors, but I expect that there are errors of grammar and possibly " content that I did not discover before finalizing the note.

## 2022-09-12 ENCOUNTER — OCCUPATIONAL MEDICINE (OUTPATIENT)
Dept: OCCUPATIONAL MEDICINE | Facility: CLINIC | Age: 42
End: 2022-09-12
Payer: COMMERCIAL

## 2022-09-12 VITALS
RESPIRATION RATE: 14 BRPM | OXYGEN SATURATION: 98 % | HEIGHT: 74 IN | TEMPERATURE: 97.9 F | HEART RATE: 88 BPM | BODY MASS INDEX: 37.73 KG/M2 | WEIGHT: 294 LBS

## 2022-09-12 DIAGNOSIS — S46.911D STRAIN OF RIGHT SHOULDER, SUBSEQUENT ENCOUNTER: ICD-10-CM

## 2022-09-12 PROCEDURE — 99203 OFFICE O/P NEW LOW 30 MIN: CPT | Performed by: PREVENTIVE MEDICINE

## 2022-09-12 ASSESSMENT — FIBROSIS 4 INDEX: FIB4 SCORE: 0.69

## 2022-09-12 NOTE — PROGRESS NOTES
"Subjective:     Ace Mcneill is a 42 y.o. 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  He was seen in the [facility] occupational medicine x3 advised NSAIDs, work restrictions and physical therapy.  Patient states that pain has not gotten worse or better.  He states that he has good range of motion good strength but with movements across the front of the body or with opening cans or jars, etc. he will get sharp pain and pop in the right shoulder.  This goes away quickly but has been concerning him since it has not improved.  He finished the prescribed physical therapy does not think he needs more at this point.  Working full duty.    ROS: All systems were reviewed on intake form, form was reviewed and signed. See scanned documents in media. Pertinent positives and negatives included in HPI.    PMH: No pertinent past medical history to this problem  MEDS: Medications were reviewed in Epic  ALLERGIES:   Allergies   Allergen Reactions    Lisinopril Cough     SOCHX: Works as a [employer] for the [employer] for the past 4 years  FH: No pertinent family history to this problem       Objective:     Pulse 88   Temp 36.6 °C (97.9 °F) (Temporal)   Resp 14   Ht 1.88 m (6' 2\")   Wt (!) 133 kg (294 lb)   SpO2 98%   BMI 37.75 kg/m²     Constitutional: Patient is in no acute distress. Appears well-developed and well-nourished.   HENT: Normocephalic and atraumatic. EOM are normal. No scleral icterus.   Cardiovascular: Normal rate.    Pulmonary/Chest: Effort normal. No respiratory distress.   Neurological: Patient is alert and oriented to person, place, and time.   Skin: Skin is warm and dry.   Psychiatric: Normal mood and affect. Behavior is normal.     Right shoulder: No gross deformity.  Area of pain over the AC " joint/anterior GH.  Full range of motion.  Strength intact.    Assessment/Plan:       1. Strain of right shoulder, subsequent encounter  - Referral to Radiology  - MR-SHOULDER-W/O RIGHT; Future    Released to Full Duty FROM 9/12/2022 TO 10/10/2022     Given concerning symptoms refer for MRI right shoulder without  OTC ibuprofen as needed  Full duty  Follow-up 4 weeks, sooner if MRI performed sooner    Differential diagnosis, natural history, supportive care, and indications for immediate follow-up discussed.    Approximately 35 minutes were spent in reviewing notes, preparing for visit, obtaining history, exam and evaluation, patient counseling/education and post visit documentation/orders.

## 2022-09-12 NOTE — LETTER
[address],   [address] [place] [address]  Phone:  [phone] - Fax:  [phone]   Occupational Health [facility] Progress Report and Disability Certification  Date of Service: 9/12/2022   No Show:  No  Date / Time of Next Visit: 10/10/2022 @1:00pm   Claim Information   Patient Name: Ace Mcneill  Claim Number:     Employer: [employer]  Date of Injury: 6/28/2022     Insurer / TPA: [employer]  ID / SSN:     Occupation: [employer]  Diagnosis: The encounter diagnosis was Strain of right shoulder, subsequent encounter.    Medical Information   Related to Industrial Injury? 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  He was seen in the [facility] occupational medicine x3 advised NSAIDs, work restrictions and physical therapy.  Patient states that pain has not gotten worse or better.  He states that he has good range of motion good strength but with movements across the front of the body or with opening cans or jars, etc. he will get sharp pain and pop in the right shoulder.  This goes away quickly but has been concerning him since it has not improved.  He finished the prescribed physical therapy does not think he needs more at this point.  Working full duty.   Objective Findings: Right shoulder: No gross deformity.  Area of pain over the AC joint/anterior GH.  Full range of motion.  Strength intact.   Pre-Existing Condition(s):     Assessment:   Condition Same    Status: Additional Care Required  Permanent Disability:No    Plan:      Diagnostics:      Comments:  Given concerning symptoms refer for MRI right shoulder without  OTC ibuprofen as needed  Full duty  Follow-up 4 weeks, sooner if MRI performed sooner    Disability Information   Status: Released to Full Duty    From:   9/12/2022  Through: 10/10/2022 Restrictions are:     Physical Restrictions   Sitting:    Standing:    Stooping:    Bending:      Squatting:    Walking:    Climbing:    Pushing:      Pulling:    Other:    Reaching Above Shoulder (L):   Reaching Above Shoulder (R):       Reaching Below Shoulder (L):    Reaching Below Shoulder (R):      Not to exceed Weight Limits   Carrying(hrs):   Weight Limit(lb):   Lifting(hrs):   Weight  Limit(lb):     Comments:      Repetitive Actions   Hands: i.e. Fine Manipulations from Grasping:     Feet: i.e. Operating Foot Controls:     Driving / Operate Machinery:     Health Care Provider’s Original or Electronic Signature  Willis Juares D.O. Health Care Provider’s Original or Electronic Signature    Ever Sanchez MD         Clinic Name / Location: [address] [address] Clinic Phone Number: Dept: [phone]   Appointment Time: 1:00 Pm Visit Start Time: 1:09 PM   Check-In Time:  12:54 Pm Visit Discharge Time:  122pm   Original-Treating Physician or Chiropractor    Page 2-Insurer/TPA    Page 3-Employer    Page 4-Employee

## 2022-10-24 ENCOUNTER — OCCUPATIONAL MEDICINE (OUTPATIENT)
Dept: OCCUPATIONAL MEDICINE | Facility: CLINIC | Age: 42
End: 2022-10-24
Payer: COMMERCIAL

## 2022-10-24 VITALS
BODY MASS INDEX: 34.39 KG/M2 | SYSTOLIC BLOOD PRESSURE: 122 MMHG | WEIGHT: 268 LBS | DIASTOLIC BLOOD PRESSURE: 78 MMHG | HEIGHT: 74 IN | RESPIRATION RATE: 18 BRPM

## 2022-10-24 DIAGNOSIS — S46.911D STRAIN OF RIGHT SHOULDER, SUBSEQUENT ENCOUNTER: ICD-10-CM

## 2022-10-24 PROCEDURE — 99213 OFFICE O/P EST LOW 20 MIN: CPT | Performed by: PREVENTIVE MEDICINE

## 2022-10-24 ASSESSMENT — ENCOUNTER SYMPTOMS
SENSORY CHANGE: 0
TINGLING: 0

## 2022-10-24 ASSESSMENT — FIBROSIS 4 INDEX: FIB4 SCORE: 0.69

## 2022-10-24 NOTE — PROGRESS NOTES
"Subjective:     Ace Mcneill is a 42 y.o. 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  He was seen in the [facility] occupational medicine x3 advised NSAIDs, work restrictions and physical therapy.  Patient states that overall symptoms in the right shoulder are about the same.  Continues to have pain with opening jars and crossed shoulder movements.  Patient states that he did have the MRI performed a week or 2 ago and is here for results.    Review of Systems   Neurological:  Negative for tingling and sensory change.     SOCHX: Works as a at the [employer]  at the [employer]  FH: No pertinent family history to this problem.       Objective:     /78   Resp 18   Ht 1.88 m (6' 2\")   Wt 122 kg (268 lb)   BMI 34.41 kg/m²     Constitutional: Patient is in no acute distress. Appears well-developed and well-nourished.   Cardiovascular: Normal rate.    Pulmonary/Chest: Effort normal. No respiratory distress.   Neurological: Patient is alert and oriented to person, place, and time.   Skin: Skin is warm and dry.   Psychiatric: Normal mood and affect. Behavior is normal.     Right shoulder: No gross deformity.  Area of pain over the AC joint/anterior GH.  Full range of motion.  Strength intact.    MRI right shoulder: Osteoarthritis of the AC joint.  Mild supraspinatus and bicipital tendinosis without rotator cuff or biceps tendon tear    Assessment/Plan:       1. Strain of right shoulder, subsequent encounter    Released to Full Duty FROM 10/24/2022 TO 11/1/2022       Discussed continue conservative care or possible steroid injection.  Patient like to proceed with steroid injection of the right shoulder.  Placed new referral for physical therapy  OTC ibuprofen as needed  Full " duty  Follow-up next week for steroid injection    Differential diagnosis, natural history, supportive care, and indications for immediate follow-up discussed.    Approximately 25 minutes was spent in preparing for visit, obtaining history, exam and evaluation, patient counseling/education and post visit documentation/orders.

## 2022-10-24 NOTE — LETTER
[address],   [address] [place] [address]  Phone:  [phone] - Fax:  [phone]   Occupational Health [facility] Progress Report and Disability Certification  Date of Service: 10/24/2022   No Show:  No  Date / Time of Next Visit: 11/1/2022, need 30 min appt for procedure@   Claim Information   Patient Name: Ace Mcneill  Claim Number:     Employer: [employer]  Date of Injury: 6/28/2022     Insurer / TPA: [employer]  ID / SSN:     Occupation: [employer]  Diagnosis: The encounter diagnosis was Strain of right shoulder, subsequent encounter.    Medical Information   Related to Industrial Injury? 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  He was seen in the [facility] occupational medicine x3 advised NSAIDs, work restrictions and physical therapy.  Patient states that overall symptoms in the right shoulder are about the same.  Continues to have pain with opening jars and crossed shoulder movements.  Patient states that he did have the MRI performed a week or 2 ago and is here for results.   Objective Findings: Right shoulder: No gross deformity.  Area of pain over the AC joint/anterior GH.  Full range of motion.  Strength intact.    MRI right shoulder: Osteoarthritis of the AC joint.  Mild supraspinatus and bicipital tendinosis without rotator cuff or biceps tendon tear   Pre-Existing Condition(s):     Assessment:   Condition Same    Status: Additional Care Required  Permanent Disability:No    Plan:      Diagnostics:      Comments:  Discussed continue conservative care or possible steroid injection.  Patient like to proceed with steroid injection of the right shoulder.  Placed new referral for physical therapy  OTC ibuprofen as needed  Full duty  Follow-up next week for steroid  injection    Disability Information   Status: Released to Full Duty    From:  10/24/2022  Through: 11/1/2022 Restrictions are:     Physical Restrictions   Sitting:    Standing:    Stooping:    Bending:      Squatting:    Walking:    Climbing:    Pushing:      Pulling:    Other:    Reaching Above Shoulder (L):   Reaching Above Shoulder (R):       Reaching Below Shoulder (L):    Reaching Below Shoulder (R):      Not to exceed Weight Limits   Carrying(hrs):   Weight Limit(lb):   Lifting(hrs):   Weight  Limit(lb):     Comments:      Repetitive Actions   Hands: i.e. Fine Manipulations from Grasping:     Feet: i.e. Operating Foot Controls:     Driving / Operate Machinery:     Health Care Provider’s Original or Electronic Signature  Willis Juares D.O. Health Care Provider’s Original or Electronic Signature    Ever Sanchez MD         Clinic Name / Location: [address],   [address] [place] [address] Clinic Phone Number: Dept: [phone]   Appointment Time: 8:00 Am Visit Start Time: 8:12 AM   Check-In Time:  8:09 Am Visit Discharge Time:  8:42am   Original-Treating Physician or Chiropractor    Page 2-Insurer/TPA    Page 3-Employer    Page 4-Employee

## 2022-11-08 ENCOUNTER — OCCUPATIONAL MEDICINE (OUTPATIENT)
Dept: OCCUPATIONAL MEDICINE | Facility: CLINIC | Age: 42
End: 2022-11-08
Payer: COMMERCIAL

## 2022-11-08 DIAGNOSIS — S46.911D STRAIN OF RIGHT SHOULDER, SUBSEQUENT ENCOUNTER: ICD-10-CM

## 2022-11-08 PROCEDURE — 20610 DRAIN/INJ JOINT/BURSA W/O US: CPT | Performed by: PREVENTIVE MEDICINE

## 2022-11-08 PROCEDURE — 99212 OFFICE O/P EST SF 10 MIN: CPT | Performed by: PREVENTIVE MEDICINE

## 2022-11-08 NOTE — PROGRESS NOTES
Subjective:     Ace Mcneill is a 42 y.o. 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  Patient states that last couple days shoulder pain has been somewhat improved.  Here for subacromial shoulder injection.    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       Objective:     There were no vitals taken for this visit.    Constitutional: Patient is in no acute distress. Appears well-developed and well-nourished.   Cardiovascular: Normal rate.    Pulmonary/Chest: Effort normal. No respiratory distress.   Neurological: Patient is alert and oriented to person, place, and time.   Skin: Skin is warm and dry.   Psychiatric: Normal mood and affect. Behavior is normal.     Right shoulder: No gross deformity.  Area of pain over the AC joint/anterior GH.  Full range of motion.  Strength intact.     MRI right shoulder: Osteoarthritis of the AC joint.  Mild supraspinatus and bicipital tendinosis without rotator cuff or biceps tendon tear    Procedure: Risks and benefits of procedure were explained at last visit.  Cleansed the area with Betadine swabs.  Used the posterior approach to inject 1 mL of triamcinolone 40 mg and 4 mL of lidocaine 1%.    Assessment/Plan:       1. Strain of right shoulder, subsequent encounter    Released to Full Duty FROM 11/8/2022 TO 12/6/2022       Placed new referral for physical therapy  OTC ibuprofen as needed  Full duty  Follow-up 1 month        Differential diagnosis, natural history, supportive care, and indications for immediate follow-up discussed.    Approximately 25 minutes was spent in preparing for visit, obtaining history, exam and evaluation, patient counseling/education and post visit documentation/orders.

## 2022-11-08 NOTE — LETTER
[address],   [address] [place] [address]  Phone:  [phone] - Fax:  [phone]   Occupational Health [facility] Progress Report and Disability Certification  Date of Service: 11/8/2022   No Show:  No  Date / Time of Next Visit: 12/6/2022@   Claim Information   Patient Name: Ace Mcneill  Claim Number:     Employer: [employer]  Date of Injury: 6/28/2022     Insurer / TPA: [employer]uel  ID / SSN:     Occupation: [employer]  Diagnosis: The encounter diagnosis was Strain of right shoulder, subsequent encounter.    Medical Information   Related to Industrial Injury? 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  Patient states that last couple days shoulder pain has been somewhat improved.  Here for subacromial shoulder injection.   Objective Findings: Right shoulder: No gross deformity.  Area of pain over the AC joint/anterior GH.  Full range of motion.  Strength intact.     MRI right shoulder: Osteoarthritis of the AC joint.  Mild supraspinatus and bicipital tendinosis without rotator cuff or biceps tendon tear    Procedure: Risks and benefits of procedure were explained at last visit.  Cleansed the area with Betadine swabs.  Used the posterior approach to inject 1 mL of triamcinolone 40 mg and 4 mL of lidocaine 1%.   Pre-Existing Condition(s):     Assessment:   Condition Same    Status: Additional Care Required  Permanent Disability:No    Plan:      Diagnostics:      Comments:  Placed new referral for physical therapy  OTC ibuprofen as needed  Full duty  Follow-up 1 month        Disability Information   Status: Released to Full Duty    From:  11/8/2022  Through: 12/6/2022 Restrictions are:     Physical Restrictions   Sitting:    Standing:    Stooping:    Bending:       Squatting:    Walking:    Climbing:    Pushing:      Pulling:    Other:    Reaching Above Shoulder (L):   Reaching Above Shoulder (R):       Reaching Below Shoulder (L):    Reaching Below Shoulder (R):      Not to exceed Weight Limits   Carrying(hrs):   Weight Limit(lb):   Lifting(hrs):   Weight  Limit(lb):     Comments:      Repetitive Actions   Hands: i.e. Fine Manipulations from Grasping:     Feet: i.e. Operating Foot Controls:     Driving / Operate Machinery:     Health Care Provider’s Original or Electronic Signature  Willis Juares D.O. Health Care Provider’s Original or Electronic Signature    Ever Sanchez MD         Clinic Name / Location: [address],   [address] [place] [address] Clinic Phone Number: Dept: [phone]   Appointment Time: 1:30 Pm Visit Start Time: 1:12 PM   Check-In Time:  1:11 Pm Visit Discharge Time:  1:36pm   Original-Treating Physician or Chiropractor    Page 2-Insurer/TPA    Page 3-Employer    Page 4-Employee

## 2022-12-06 ENCOUNTER — OCCUPATIONAL MEDICINE (OUTPATIENT)
Dept: OCCUPATIONAL MEDICINE | Facility: CLINIC | Age: 42
End: 2022-12-06
Payer: COMMERCIAL

## 2022-12-06 VITALS
WEIGHT: 268 LBS | HEART RATE: 95 BPM | BODY MASS INDEX: 34.39 KG/M2 | TEMPERATURE: 98.6 F | SYSTOLIC BLOOD PRESSURE: 140 MMHG | DIASTOLIC BLOOD PRESSURE: 82 MMHG | OXYGEN SATURATION: 97 % | HEIGHT: 74 IN | RESPIRATION RATE: 16 BRPM

## 2022-12-06 DIAGNOSIS — S46.911D STRAIN OF RIGHT SHOULDER, SUBSEQUENT ENCOUNTER: ICD-10-CM

## 2022-12-06 PROCEDURE — 99213 OFFICE O/P EST LOW 20 MIN: CPT | Performed by: PREVENTIVE MEDICINE

## 2022-12-06 ASSESSMENT — FIBROSIS 4 INDEX: FIB4 SCORE: 0.69

## 2022-12-06 ASSESSMENT — PAIN SCALES - GENERAL: PAINLEVEL: NO PAIN

## 2022-12-06 NOTE — LETTER
[address],   [address] [place] [address]  Phone:  [phone] - Fax:  [phone]   Occupational Health [facility] Progress Report and Disability Certification  Date of Service: 12/6/2022   No Show:  No  Date / Time of Next Visit: 1/17/2023@4:30PM   Claim Information   Patient Name: Ace Mcneill  Claim Number:     Employer: [employer]  Date of Injury: 6/28/2022     Insurer / TPA: Anuel  ID / SSN:     Occupation: [employer]  Diagnosis: The encounter diagnosis was Strain of right shoulder, subsequent encounter.    Medical Information   Related to Industrial Injury? 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  Patient states that since getting the injection at last visit he was noted almost complete improvement in the pain.  He states he still gets little soreness.  He also gets occasional popping but not associate with any pain.  He states he has good use of the right shoulder now.  He did restart physical therapy which he has been getting even more improvements with now.   Objective Findings: Right shoulder: No gross deformity.   Full range of motion.  Strength intact.     MRI right shoulder: Osteoarthritis of the AC joint.  Mild supraspinatus and bicipital tendinosis without rotator cuff or biceps tendon tear   Pre-Existing Condition(s):     Assessment:   Condition Same    Status: Additional Care Required  Permanent Disability:No    Plan:      Diagnostics:      Comments:  Continue physical therapy  OTC ibuprofen as needed  Full duty  Follow-up 6 weeks, if improvement continues will consider release from care    Disability Information   Status: Released to Full Duty    From:  12/6/2022  Through: 1/17/2023 Restrictions are:     Physical Restrictions   Sitting:     Standing:    Stooping:    Bending:      Squatting:    Walking:    Climbing:    Pushing:      Pulling:    Other:    Reaching Above Shoulder (L):   Reaching Above Shoulder (R):       Reaching Below Shoulder (L):    Reaching Below Shoulder (R):      Not to exceed Weight Limits   Carrying(hrs):   Weight Limit(lb):   Lifting(hrs):   Weight  Limit(lb):     Comments:      Repetitive Actions   Hands: i.e. Fine Manipulations from Grasping:     Feet: i.e. Operating Foot Controls:     Driving / Operate Machinery:     Health Care Provider’s Original or Electronic Signature  Willis Juares D.O. Health Care Provider’s Original or Electronic Signature    Willis Juares DO MPH     Clinic Name / Location: [address],   Suite [facility]  [place] [address] Clinic Phone Number: Dept: [phone]   Appointment Time: 7:30 Am Visit Start Time: 7:36 AM   Check-In Time:  7:31 Am Visit Discharge Time:  7:49AM   Original-Treating Physician or Chiropractor    Page 2-Insurer/TPA    Page 3-Employer    Page 4-Employee

## 2022-12-06 NOTE — PROGRESS NOTES
"Subjective:     Ace Mcneill is a 42 y.o. 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  Patient states that since getting the injection at last visit he was noted almost complete improvement in the pain.  He states he still gets little soreness.  He also gets occasional popping but not associate with any pain.  He states he has good use of the right shoulder now.  He did restart physical therapy which he has been getting even more improvements with now.    ROS    SOCHX: Works as at the [employer]  FH: No pertinent family history to this problem.       Objective:     BP (!) 140/82   Pulse 95   Temp 37 °C (98.6 °F) (Temporal)   Resp 16   Ht 1.88 m (6' 2\")   Wt 122 kg (268 lb)   SpO2 97%   BMI 34.41 kg/m²     Constitutional: Patient is in no acute distress. Appears well-developed and well-nourished.   Cardiovascular: Normal rate.    Pulmonary/Chest: Effort normal. No respiratory distress.   Neurological: Patient is alert and oriented to person, place, and time.   Skin: Skin is warm and dry.   Psychiatric: Normal mood and affect. Behavior is normal.     Right shoulder: No gross deformity.   Full range of motion.  Strength intact.     MRI right shoulder: Osteoarthritis of the AC joint.  Mild supraspinatus and bicipital tendinosis without rotator cuff or biceps tendon tear    Assessment/Plan:       1. Strain of right shoulder, subsequent encounter    Released to Full Duty FROM 12/6/2022 TO 1/17/2023       Continue physical therapy  OTC ibuprofen as needed  Full duty  Follow-up 6 weeks, if improvement continues will consider release from care    Differential diagnosis, natural history, supportive care, and indications for immediate follow-up " discussed.    Approximately 25 minutes was spent in preparing for visit, obtaining history, exam and evaluation, patient counseling/education and post visit documentation/orders.

## 2023-01-13 ENCOUNTER — HOSPITAL ENCOUNTER (OUTPATIENT)
Dept: LAB | Facility: MEDICAL CENTER | Age: 43
End: 2023-01-13
Attending: FAMILY MEDICINE
Payer: COMMERCIAL

## 2023-01-13 LAB
ALBUMIN SERPL BCP-MCNC: 4.3 G/DL (ref 3.2–4.9)
ALBUMIN/GLOB SERPL: 1.8 G/DL
ALP SERPL-CCNC: 61 U/L (ref 30–99)
ALT SERPL-CCNC: 32 U/L (ref 2–50)
ANION GAP SERPL CALC-SCNC: 12 MMOL/L (ref 7–16)
AST SERPL-CCNC: 23 U/L (ref 12–45)
BILIRUB SERPL-MCNC: 0.9 MG/DL (ref 0.1–1.5)
BUN SERPL-MCNC: 14 MG/DL (ref 8–22)
CALCIUM ALBUM COR SERPL-MCNC: 9.3 MG/DL (ref 8.5–10.5)
CALCIUM SERPL-MCNC: 9.5 MG/DL (ref 8.5–10.5)
CHLORIDE SERPL-SCNC: 102 MMOL/L (ref 96–112)
CHOLEST SERPL-MCNC: 210 MG/DL (ref 100–199)
CO2 SERPL-SCNC: 25 MMOL/L (ref 20–33)
CREAT SERPL-MCNC: 1.33 MG/DL (ref 0.5–1.4)
CREAT UR-MCNC: 89.66 MG/DL
EST. AVERAGE GLUCOSE BLD GHB EST-MCNC: 318 MG/DL
FASTING STATUS PATIENT QL REPORTED: NORMAL
GFR SERPLBLD CREATININE-BSD FMLA CKD-EPI: 68 ML/MIN/1.73 M 2
GLOBULIN SER CALC-MCNC: 2.4 G/DL (ref 1.9–3.5)
GLUCOSE SERPL-MCNC: 168 MG/DL (ref 65–99)
HBA1C MFR BLD: 12.7 % (ref 4–5.6)
HDLC SERPL-MCNC: 26 MG/DL
LDLC SERPL CALC-MCNC: 109 MG/DL
MICROALBUMIN UR-MCNC: 9.5 MG/DL
MICROALBUMIN/CREAT UR: 106 MG/G (ref 0–30)
POTASSIUM SERPL-SCNC: 4.5 MMOL/L (ref 3.6–5.5)
PROT SERPL-MCNC: 6.7 G/DL (ref 6–8.2)
SODIUM SERPL-SCNC: 139 MMOL/L (ref 135–145)
TRIGL SERPL-MCNC: 373 MG/DL (ref 0–149)
TSH SERPL DL<=0.005 MIU/L-ACNC: 3.08 UIU/ML (ref 0.38–5.33)

## 2023-01-13 PROCEDURE — 36415 COLL VENOUS BLD VENIPUNCTURE: CPT

## 2023-01-13 PROCEDURE — 86341 ISLET CELL ANTIBODY: CPT

## 2023-01-13 PROCEDURE — 83036 HEMOGLOBIN GLYCOSYLATED A1C: CPT

## 2023-01-13 PROCEDURE — 84443 ASSAY THYROID STIM HORMONE: CPT

## 2023-01-13 PROCEDURE — 82043 UR ALBUMIN QUANTITATIVE: CPT

## 2023-01-13 PROCEDURE — 80053 COMPREHEN METABOLIC PANEL: CPT

## 2023-01-13 PROCEDURE — 82570 ASSAY OF URINE CREATININE: CPT

## 2023-01-13 PROCEDURE — 80061 LIPID PANEL: CPT

## 2023-01-17 ENCOUNTER — OCCUPATIONAL MEDICINE (OUTPATIENT)
Dept: OCCUPATIONAL MEDICINE | Facility: CLINIC | Age: 43
End: 2023-01-17
Payer: COMMERCIAL

## 2023-01-17 VITALS
HEART RATE: 101 BPM | DIASTOLIC BLOOD PRESSURE: 70 MMHG | RESPIRATION RATE: 16 BRPM | BODY MASS INDEX: 31.44 KG/M2 | HEIGHT: 74 IN | SYSTOLIC BLOOD PRESSURE: 134 MMHG | WEIGHT: 245 LBS | OXYGEN SATURATION: 96 %

## 2023-01-17 DIAGNOSIS — S46.911D STRAIN OF RIGHT SHOULDER, SUBSEQUENT ENCOUNTER: ICD-10-CM

## 2023-01-17 LAB — GAD65 AB SER IA-ACNC: <5 IU/ML (ref 0–5)

## 2023-01-17 PROCEDURE — 99213 OFFICE O/P EST LOW 20 MIN: CPT | Performed by: PREVENTIVE MEDICINE

## 2023-01-17 ASSESSMENT — FIBROSIS 4 INDEX: FIB4 SCORE: 0.64

## 2023-01-17 NOTE — LETTER
[address],   [address] [place] [address]  Phone:  [phone] - Fax:  [phone]   Occupational Health [facility] Progress Report and Disability Certification  Date of Service: 1/17/2023   No Show:  No  Date / Time of Next Visit: 2/14/2023@4:30 PM   Claim Information   Patient Name: Ace Mcneill  Claim Number:     Employer: [employer]  Date of Injury: 6/28/2022     Insurer / TPA: [employer]uel  ID / SSN:     Occupation: [employer]  Diagnosis: The encounter diagnosis was Strain of right shoulder, subsequent encounter.    Medical Information   Related to Industrial Injury? 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  Patient states that overall symptoms are little bit worse than before.  He states he has been doing well but last night slept wrong and woke up with more right shoulder pain.  He states that he still has 3 or 4 visits of PT remaining, there is a delay in treatment due to the holidays and some scheduling conflicts.  He is set to resume physical therapy on Friday.   Objective Findings:    Right shoulder: No gross deformity.   Full range of motion.  Strength intact.     MRI right shoulder: Osteoarthritis of the AC joint.  Mild supraspinatus and bicipital tendinosis without rotator cuff or biceps tendon tear   Pre-Existing Condition(s):     Assessment:   Condition Same    Status: Additional Care Required  Permanent Disability:No    Plan:      Diagnostics:      Comments:  Continue physical therapy  OTC ibuprofen as needed  Full duty  Follow-up 4 weeks, if no improvement will consider further referral to orthopedics    Disability Information   Status: Released to Full Duty    From:  1/17/2023  Through: 2/14/2023 Restrictions are:     Physical Restrictions   Sitting:     Standing:    Stooping:    Bending:      Squatting:    Walking:    Climbing:    Pushing:      Pulling:    Other:    Reaching Above Shoulder (L):   Reaching Above Shoulder (R):       Reaching Below Shoulder (L):    Reaching Below Shoulder (R):      Not to exceed Weight Limits   Carrying(hrs):   Weight Limit(lb):   Lifting(hrs):   Weight  Limit(lb):     Comments:      Repetitive Actions   Hands: i.e. Fine Manipulations from Grasping:     Feet: i.e. Operating Foot Controls:     Driving / Operate Machinery:     Health Care Provider’s Original or Electronic Signature  Willis Juares D.O. Health Care Provider’s Original or Electronic Signature    Willis Juares DO MPH     Clinic Name / Location: [address],   Suite [facility]  [place] [address] Clinic Phone Number: Dept: [phone]   Appointment Time: 4:30 Pm Visit Start Time: 4:26 PM   Check-In Time:  4:21 Pm Visit Discharge Time:  4:39 PM   Original-Treating Physician or Chiropractor    Page 2-Insurer/TPA    Page 3-Employer    Page 4-Employee

## 2023-01-18 NOTE — PROGRESS NOTES
"Subjective:     Ace Mcneill is a 42 y.o. 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  Patient states that overall symptoms are little bit worse than before.  He states he has been doing well but last night slept wrong and woke up with more right shoulder pain.  He states that he still has 3 or 4 visits of PT remaining, there is a delay in treatment due to the holidays and some scheduling conflicts.  He is set to resume physical therapy on Friday.    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       Objective:     /70 (BP Location: Right arm, Patient Position: Sitting, BP Cuff Size: Large adult)   Pulse (!) 101   Resp 16   Ht 1.88 m (6' 2\")   Wt 111 kg (245 lb)   SpO2 96%   BMI 31.46 kg/m²     Constitutional: Patient is in no acute distress. Appears well-developed and well-nourished.   Cardiovascular: Normal rate.    Pulmonary/Chest: Effort normal. No respiratory distress.   Neurological: Patient is alert and oriented to person, place, and time.   Skin: Skin is warm and dry.   Psychiatric: Normal mood and affect. Behavior is normal.        Right shoulder: No gross deformity.   Full range of motion.  Strength intact.     MRI right shoulder: Osteoarthritis of the AC joint.  Mild supraspinatus and bicipital tendinosis without rotator cuff or biceps tendon tear    Assessment/Plan:       1. Strain of right shoulder, subsequent encounter    Released to Full Duty FROM 1/17/2023 TO 2/14/2023       Continue physical therapy  OTC ibuprofen as needed  Full duty  Follow-up 4 weeks, if no improvement will consider further referral to orthopedics    Differential diagnosis, natural history, supportive care, and indications for immediate follow-up " discussed.    Approximately 25 minutes was spent in preparing for visit, obtaining history, exam and evaluation, patient counseling/education and post visit documentation/orders.

## 2023-02-22 ENCOUNTER — OCCUPATIONAL MEDICINE (OUTPATIENT)
Dept: OCCUPATIONAL MEDICINE | Facility: CLINIC | Age: 43
End: 2023-02-22
Payer: COMMERCIAL

## 2023-02-22 VITALS
HEART RATE: 103 BPM | TEMPERATURE: 98.6 F | OXYGEN SATURATION: 95 % | RESPIRATION RATE: 16 BRPM | WEIGHT: 245 LBS | SYSTOLIC BLOOD PRESSURE: 128 MMHG | HEIGHT: 74 IN | BODY MASS INDEX: 31.44 KG/M2 | DIASTOLIC BLOOD PRESSURE: 74 MMHG

## 2023-02-22 DIAGNOSIS — S46.911D STRAIN OF RIGHT SHOULDER, SUBSEQUENT ENCOUNTER: ICD-10-CM

## 2023-02-22 PROCEDURE — 99213 OFFICE O/P EST LOW 20 MIN: CPT | Performed by: PREVENTIVE MEDICINE

## 2023-02-22 ASSESSMENT — FIBROSIS 4 INDEX: FIB4 SCORE: 0.64

## 2023-02-22 NOTE — LETTER
[address],   [address] [place] [address]  Phone:  [phone] - Fax:  [phone]   Occupational Health [facility] Progress Report and Disability Certification  Date of Service: 2/22/2023   No Show:  No  Date / Time of Next Visit: 4/26/2023   Claim Information   Patient Name: Ace Mcneill  Claim Number:     Employer: [employer]  Date of Injury: 6/28/2022     Insurer / TPA: [employer]uel  ID / SSN:     Occupation: [employer]  Diagnosis: The encounter diagnosis was Strain of right shoulder, subsequent encounter.    Medical Information   Related to Industrial Injury? 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  Overall has had good improvement from last visit.  Was able to return to physical therapy and doing therapy consistently has been helpful.  Would like a few more visits.  Continue to work full duty.   Objective Findings: Right shoulder: No gross deformity.   Full range of motion.  Strength intact.     MRI right shoulder: Osteoarthritis of the AC joint.  Mild supraspinatus and bicipital tendinosis without rotator cuff or biceps tendon tear   Pre-Existing Condition(s):     Assessment:   Condition Same    Status: Additional Care Required  Permanent Disability:No    Plan:      Diagnostics:      Comments:  Continue physical therapy, placed referral for 6 more visits  OTC ibuprofen as needed  Full duty  Follow-up 8 weeks    Disability Information   Status: Released to Full Duty    From:  2/22/2023  Through: 4/26/2023 Restrictions are:     Physical Restrictions   Sitting:    Standing:    Stooping:    Bending:      Squatting:    Walking:    Climbing:    Pushing:      Pulling:    Other:    Reaching Above Shoulder (L):   Reaching Above Shoulder (R):       Reaching Below Shoulder  (L):    Reaching Below Shoulder (R):      Not to exceed Weight Limits   Carrying(hrs):   Weight Limit(lb):   Lifting(hrs):   Weight  Limit(lb):     Comments:      Repetitive Actions   Hands: i.e. Fine Manipulations from Grasping:     Feet: i.e. Operating Foot Controls:     Driving / Operate Machinery:     Health Care Provider’s Original or Electronic Signature  Willis Juares D.O. Health Care Provider’s Original or Electronic Signature    Willis Juares DO MPH     Clinic Name / Location: [address],   [address] [place] [address] Clinic Phone Number: Dept: [phone]   Appointment Time: 2:45 Pm Visit Start Time: 2:47 PM   Check-In Time:  2:40 Pm Visit Discharge Time:  2:58 PM   Original-Treating Physician or Chiropractor    Page 2-Insurer/TPA    Page 3-Employer    Page 4-Employee

## 2023-02-22 NOTE — PROGRESS NOTES
"Subjective:     Ace Mcneill is a 42 y.o. 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  Overall has had good improvement from last visit.  Was able to return to physical therapy and doing therapy consistently has been helpful.  Would like a few more visits.  Continue to work full duty.    ROS    SOCHX: Works as an officer at the [employer]  FH: No pertinent family history to this problem.       Objective:     /74 (BP Location: Left arm, Patient Position: Sitting, BP Cuff Size: Large adult)   Pulse (!) 103   Temp 37 °C (98.6 °F) (Temporal)   Resp 16   Ht 1.88 m (6' 2\")   Wt 111 kg (245 lb)   SpO2 95%   BMI 31.46 kg/m²     Constitutional: Patient is in no acute distress. Appears well-developed and well-nourished.   Cardiovascular: Normal rate.    Pulmonary/Chest: Effort normal. No respiratory distress.   Neurological: Patient is alert and oriented to person, place, and time.   Skin: Skin is warm and dry.   Psychiatric: Normal mood and affect. Behavior is normal.     Right shoulder: No gross deformity.   Full range of motion.  Strength intact.     MRI right shoulder: Osteoarthritis of the AC joint.  Mild supraspinatus and bicipital tendinosis without rotator cuff or biceps tendon tear    Assessment/Plan:       1. Strain of right shoulder, subsequent encounter  - Referral to Physical Therapy    Released to Full Duty FROM 2/22/2023 TO 4/26/2023       Continue physical therapy, placed referral for 6 more visits  OTC ibuprofen as needed  Full duty  Follow-up 8 weeks    Differential diagnosis, natural history, supportive care, and indications for immediate follow-up discussed.    Approximately 25 minutes was spent in preparing " for visit, obtaining history, exam and evaluation, patient counseling/education and post visit documentation/orders.

## 2024-03-22 ENCOUNTER — HOSPITAL ENCOUNTER (OUTPATIENT)
Dept: LAB | Facility: MEDICAL CENTER | Age: 44
End: 2024-03-22
Attending: FAMILY MEDICINE
Payer: COMMERCIAL

## 2024-03-22 LAB
ALBUMIN SERPL BCP-MCNC: 4.5 G/DL (ref 3.2–4.9)
ALBUMIN/GLOB SERPL: 1.7 G/DL
ALP SERPL-CCNC: 71 U/L (ref 30–99)
ALT SERPL-CCNC: 30 U/L (ref 2–50)
ANION GAP SERPL CALC-SCNC: 20 MMOL/L (ref 7–16)
AST SERPL-CCNC: 25 U/L (ref 12–45)
BILIRUB SERPL-MCNC: 1.3 MG/DL (ref 0.1–1.5)
BUN SERPL-MCNC: 20 MG/DL (ref 8–22)
CALCIUM ALBUM COR SERPL-MCNC: 9.2 MG/DL (ref 8.5–10.5)
CALCIUM SERPL-MCNC: 9.6 MG/DL (ref 8.5–10.5)
CHLORIDE SERPL-SCNC: 94 MMOL/L (ref 96–112)
CHOLEST SERPL-MCNC: 315 MG/DL (ref 100–199)
CO2 SERPL-SCNC: 20 MMOL/L (ref 20–33)
CREAT SERPL-MCNC: 1.2 MG/DL (ref 0.5–1.4)
FASTING STATUS PATIENT QL REPORTED: NORMAL
GFR SERPLBLD CREATININE-BSD FMLA CKD-EPI: 76 ML/MIN/1.73 M 2
GLOBULIN SER CALC-MCNC: 2.6 G/DL (ref 1.9–3.5)
GLUCOSE SERPL-MCNC: 257 MG/DL (ref 65–99)
HDLC SERPL-MCNC: 25 MG/DL
LDLC SERPL CALC-MCNC: ABNORMAL MG/DL
POTASSIUM SERPL-SCNC: 4.4 MMOL/L (ref 3.6–5.5)
PROT SERPL-MCNC: 7.1 G/DL (ref 6–8.2)
SODIUM SERPL-SCNC: 134 MMOL/L (ref 135–145)
TRIGL SERPL-MCNC: 1121 MG/DL (ref 0–149)
TSH SERPL DL<=0.005 MIU/L-ACNC: 2.69 UIU/ML (ref 0.38–5.33)
URATE SERPL-MCNC: 7.3 MG/DL (ref 2.5–8.3)

## 2024-03-22 PROCEDURE — 82570 ASSAY OF URINE CREATININE: CPT

## 2024-03-22 PROCEDURE — 36415 COLL VENOUS BLD VENIPUNCTURE: CPT

## 2024-03-22 PROCEDURE — 80053 COMPREHEN METABOLIC PANEL: CPT

## 2024-03-22 PROCEDURE — 83036 HEMOGLOBIN GLYCOSYLATED A1C: CPT

## 2024-03-22 PROCEDURE — 84443 ASSAY THYROID STIM HORMONE: CPT

## 2024-03-22 PROCEDURE — 80061 LIPID PANEL: CPT

## 2024-03-22 PROCEDURE — 84550 ASSAY OF BLOOD/URIC ACID: CPT

## 2024-03-22 PROCEDURE — 82043 UR ALBUMIN QUANTITATIVE: CPT

## 2024-03-23 LAB
CREAT UR-MCNC: 59.29 MG/DL
EST. AVERAGE GLUCOSE BLD GHB EST-MCNC: 372 MG/DL
HBA1C MFR BLD: 14.6 % (ref 4–5.6)
MICROALBUMIN UR-MCNC: 14.6 MG/DL
MICROALBUMIN/CREAT UR: 246 MG/G (ref 0–30)

## 2024-04-08 ENCOUNTER — HOSPITAL ENCOUNTER (OUTPATIENT)
Dept: LAB | Facility: MEDICAL CENTER | Age: 44
End: 2024-04-08
Attending: FAMILY MEDICINE
Payer: COMMERCIAL

## 2024-04-08 LAB
ALBUMIN SERPL BCP-MCNC: 4.4 G/DL (ref 3.2–4.9)
ALBUMIN/GLOB SERPL: 1.6 G/DL
ALP SERPL-CCNC: 54 U/L (ref 30–99)
ALT SERPL-CCNC: 32 U/L (ref 2–50)
ANION GAP SERPL CALC-SCNC: 15 MMOL/L (ref 7–16)
AST SERPL-CCNC: 33 U/L (ref 12–45)
BASOPHILS # BLD AUTO: 0.8 % (ref 0–1.8)
BASOPHILS # BLD: 0.04 K/UL (ref 0–0.12)
BILIRUB SERPL-MCNC: 1.3 MG/DL (ref 0.1–1.5)
BUN SERPL-MCNC: 17 MG/DL (ref 8–22)
CALCIUM ALBUM COR SERPL-MCNC: 9.5 MG/DL (ref 8.5–10.5)
CALCIUM SERPL-MCNC: 9.8 MG/DL (ref 8.5–10.5)
CHLORIDE SERPL-SCNC: 96 MMOL/L (ref 96–112)
CHOLEST SERPL-MCNC: 202 MG/DL (ref 100–199)
CO2 SERPL-SCNC: 22 MMOL/L (ref 20–33)
CREAT SERPL-MCNC: 1.18 MG/DL (ref 0.5–1.4)
EOSINOPHIL # BLD AUTO: 0.09 K/UL (ref 0–0.51)
EOSINOPHIL NFR BLD: 1.7 % (ref 0–6.9)
ERYTHROCYTE [DISTWIDTH] IN BLOOD BY AUTOMATED COUNT: 39.1 FL (ref 35.9–50)
EST. AVERAGE GLUCOSE BLD GHB EST-MCNC: 326 MG/DL
FASTING STATUS PATIENT QL REPORTED: NORMAL
GFR SERPLBLD CREATININE-BSD FMLA CKD-EPI: 78 ML/MIN/1.73 M 2
GLOBULIN SER CALC-MCNC: 2.7 G/DL (ref 1.9–3.5)
GLUCOSE SERPL-MCNC: 113 MG/DL (ref 65–99)
HBA1C MFR BLD: 13 % (ref 4–5.6)
HCT VFR BLD AUTO: 51.3 % (ref 42–52)
HDLC SERPL-MCNC: 27 MG/DL
HGB BLD-MCNC: 17.8 G/DL (ref 14–18)
IMM GRANULOCYTES # BLD AUTO: 0.03 K/UL (ref 0–0.11)
IMM GRANULOCYTES NFR BLD AUTO: 0.6 % (ref 0–0.9)
LDLC SERPL CALC-MCNC: 131 MG/DL
LYMPHOCYTES # BLD AUTO: 1.32 K/UL (ref 1–4.8)
LYMPHOCYTES NFR BLD: 25.5 % (ref 22–41)
MCH RBC QN AUTO: 29.9 PG (ref 27–33)
MCHC RBC AUTO-ENTMCNC: 34.7 G/DL (ref 32.3–36.5)
MCV RBC AUTO: 86.1 FL (ref 81.4–97.8)
MONOCYTES # BLD AUTO: 0.73 K/UL (ref 0–0.85)
MONOCYTES NFR BLD AUTO: 14.1 % (ref 0–13.4)
NEUTROPHILS # BLD AUTO: 2.96 K/UL (ref 1.82–7.42)
NEUTROPHILS NFR BLD: 57.3 % (ref 44–72)
NRBC # BLD AUTO: 0 K/UL
NRBC BLD-RTO: 0 /100 WBC (ref 0–0.2)
PLATELET # BLD AUTO: 227 K/UL (ref 164–446)
PMV BLD AUTO: 10 FL (ref 9–12.9)
POTASSIUM SERPL-SCNC: 3.9 MMOL/L (ref 3.6–5.5)
PROT SERPL-MCNC: 7.1 G/DL (ref 6–8.2)
RBC # BLD AUTO: 5.96 M/UL (ref 4.7–6.1)
SODIUM SERPL-SCNC: 133 MMOL/L (ref 135–145)
TRIGL SERPL-MCNC: 219 MG/DL (ref 0–149)
WBC # BLD AUTO: 5.2 K/UL (ref 4.8–10.8)

## 2024-04-08 PROCEDURE — 82010 KETONE BODYS QUAN: CPT

## 2024-04-08 PROCEDURE — 36415 COLL VENOUS BLD VENIPUNCTURE: CPT

## 2024-04-08 PROCEDURE — 85025 COMPLETE CBC W/AUTO DIFF WBC: CPT

## 2024-04-08 PROCEDURE — 80053 COMPREHEN METABOLIC PANEL: CPT

## 2024-04-08 PROCEDURE — 83036 HEMOGLOBIN GLYCOSYLATED A1C: CPT

## 2024-04-08 PROCEDURE — 80061 LIPID PANEL: CPT

## 2024-04-11 LAB — ACETONE SERPL-MCNC: <5 MG/DL

## 2024-07-29 ENCOUNTER — HOSPITAL ENCOUNTER (OUTPATIENT)
Dept: LAB | Facility: MEDICAL CENTER | Age: 44
End: 2024-07-29
Attending: FAMILY MEDICINE
Payer: COMMERCIAL

## 2024-07-29 LAB
ALBUMIN SERPL BCP-MCNC: 4.4 G/DL (ref 3.2–4.9)
ALBUMIN/GLOB SERPL: 1.8 G/DL
ALP SERPL-CCNC: 49 U/L (ref 30–99)
ALT SERPL-CCNC: 33 U/L (ref 2–50)
ANION GAP SERPL CALC-SCNC: 15 MMOL/L (ref 7–16)
AST SERPL-CCNC: 26 U/L (ref 12–45)
BASOPHILS # BLD AUTO: 0.9 % (ref 0–1.8)
BASOPHILS # BLD: 0.08 K/UL (ref 0–0.12)
BILIRUB SERPL-MCNC: 1.3 MG/DL (ref 0.1–1.5)
BUN SERPL-MCNC: 18 MG/DL (ref 8–22)
CALCIUM ALBUM COR SERPL-MCNC: 9.3 MG/DL (ref 8.5–10.5)
CALCIUM SERPL-MCNC: 9.6 MG/DL (ref 8.5–10.5)
CHLORIDE SERPL-SCNC: 98 MMOL/L (ref 96–112)
CHOLEST SERPL-MCNC: 178 MG/DL (ref 100–199)
CO2 SERPL-SCNC: 24 MMOL/L (ref 20–33)
CREAT SERPL-MCNC: 1.42 MG/DL (ref 0.5–1.4)
EOSINOPHIL # BLD AUTO: 0.17 K/UL (ref 0–0.51)
EOSINOPHIL NFR BLD: 1.8 % (ref 0–6.9)
ERYTHROCYTE [DISTWIDTH] IN BLOOD BY AUTOMATED COUNT: 40.9 FL (ref 35.9–50)
EST. AVERAGE GLUCOSE BLD GHB EST-MCNC: 171 MG/DL
GFR SERPLBLD CREATININE-BSD FMLA CKD-EPI: 62 ML/MIN/1.73 M 2
GLOBULIN SER CALC-MCNC: 2.4 G/DL (ref 1.9–3.5)
GLUCOSE SERPL-MCNC: 151 MG/DL (ref 65–99)
HBA1C MFR BLD: 7.6 % (ref 4–5.6)
HCT VFR BLD AUTO: 54.6 % (ref 42–52)
HDLC SERPL-MCNC: 26 MG/DL
HGB BLD-MCNC: 18.9 G/DL (ref 14–18)
IMM GRANULOCYTES # BLD AUTO: 0.04 K/UL (ref 0–0.11)
IMM GRANULOCYTES NFR BLD AUTO: 0.4 % (ref 0–0.9)
LDLC SERPL CALC-MCNC: 89 MG/DL
LYMPHOCYTES # BLD AUTO: 2.22 K/UL (ref 1–4.8)
LYMPHOCYTES NFR BLD: 23.9 % (ref 22–41)
MCH RBC QN AUTO: 29.5 PG (ref 27–33)
MCHC RBC AUTO-ENTMCNC: 34.6 G/DL (ref 32.3–36.5)
MCV RBC AUTO: 85.3 FL (ref 81.4–97.8)
MONOCYTES # BLD AUTO: 0.67 K/UL (ref 0–0.85)
MONOCYTES NFR BLD AUTO: 7.2 % (ref 0–13.4)
NEUTROPHILS # BLD AUTO: 6.12 K/UL (ref 1.82–7.42)
NEUTROPHILS NFR BLD: 65.8 % (ref 44–72)
NRBC # BLD AUTO: 0 K/UL
NRBC BLD-RTO: 0 /100 WBC (ref 0–0.2)
PLATELET # BLD AUTO: 300 K/UL (ref 164–446)
PMV BLD AUTO: 10.2 FL (ref 9–12.9)
POTASSIUM SERPL-SCNC: 3.8 MMOL/L (ref 3.6–5.5)
PROT SERPL-MCNC: 6.8 G/DL (ref 6–8.2)
RBC # BLD AUTO: 6.4 M/UL (ref 4.7–6.1)
SODIUM SERPL-SCNC: 137 MMOL/L (ref 135–145)
TRIGL SERPL-MCNC: 313 MG/DL (ref 0–149)
WBC # BLD AUTO: 9.3 K/UL (ref 4.8–10.8)

## 2024-07-29 PROCEDURE — 82010 KETONE BODYS QUAN: CPT

## 2024-07-29 PROCEDURE — 83036 HEMOGLOBIN GLYCOSYLATED A1C: CPT

## 2024-07-29 PROCEDURE — 80053 COMPREHEN METABOLIC PANEL: CPT

## 2024-07-29 PROCEDURE — 80061 LIPID PANEL: CPT

## 2024-07-29 PROCEDURE — 85025 COMPLETE CBC W/AUTO DIFF WBC: CPT

## 2024-07-29 PROCEDURE — 36415 COLL VENOUS BLD VENIPUNCTURE: CPT

## 2024-08-01 LAB — ACETONE SERPL-MCNC: <5 MG/DL

## 2024-12-20 ENCOUNTER — HOSPITAL ENCOUNTER (OUTPATIENT)
Dept: LAB | Facility: MEDICAL CENTER | Age: 44
End: 2024-12-20
Attending: FAMILY MEDICINE
Payer: COMMERCIAL

## 2024-12-20 LAB
ALBUMIN SERPL BCP-MCNC: 4.1 G/DL (ref 3.2–4.9)
ALBUMIN/GLOB SERPL: 1.8 G/DL
ALP SERPL-CCNC: 60 U/L (ref 30–99)
ALT SERPL-CCNC: 25 U/L (ref 2–50)
ANION GAP SERPL CALC-SCNC: 10 MMOL/L (ref 7–16)
AST SERPL-CCNC: 22 U/L (ref 12–45)
BILIRUB SERPL-MCNC: 0.9 MG/DL (ref 0.1–1.5)
BUN SERPL-MCNC: 12 MG/DL (ref 8–22)
CALCIUM ALBUM COR SERPL-MCNC: 8.7 MG/DL (ref 8.5–10.5)
CALCIUM SERPL-MCNC: 8.8 MG/DL (ref 8.5–10.5)
CHLORIDE SERPL-SCNC: 99 MMOL/L (ref 96–112)
CHOLEST SERPL-MCNC: 169 MG/DL (ref 100–199)
CO2 SERPL-SCNC: 24 MMOL/L (ref 20–33)
CREAT SERPL-MCNC: 1.14 MG/DL (ref 0.5–1.4)
EST. AVERAGE GLUCOSE BLD GHB EST-MCNC: 321 MG/DL
FASTING STATUS PATIENT QL REPORTED: NORMAL
GFR SERPLBLD CREATININE-BSD FMLA CKD-EPI: 81 ML/MIN/1.73 M 2
GLOBULIN SER CALC-MCNC: 2.3 G/DL (ref 1.9–3.5)
GLUCOSE SERPL-MCNC: 355 MG/DL (ref 65–99)
HBA1C MFR BLD: 12.8 % (ref 4–5.6)
HDLC SERPL-MCNC: 25 MG/DL
LDLC SERPL CALC-MCNC: 86 MG/DL
POTASSIUM SERPL-SCNC: 4.5 MMOL/L (ref 3.6–5.5)
PROT SERPL-MCNC: 6.4 G/DL (ref 6–8.2)
SODIUM SERPL-SCNC: 133 MMOL/L (ref 135–145)
TRIGL SERPL-MCNC: 290 MG/DL (ref 0–149)

## 2024-12-20 PROCEDURE — 36415 COLL VENOUS BLD VENIPUNCTURE: CPT

## 2024-12-20 PROCEDURE — 80053 COMPREHEN METABOLIC PANEL: CPT

## 2024-12-20 PROCEDURE — 83036 HEMOGLOBIN GLYCOSYLATED A1C: CPT

## 2024-12-20 PROCEDURE — 80061 LIPID PANEL: CPT

## 2024-12-30 NOTE — ED NOTES
"POC glucose read \"high\"  " Post-Op Assessment Note    CV Status:  Stable  Pain Score: 0    Pain management: adequate       Mental Status:  Sleepy   Hydration Status:  Euvolemic   PONV Controlled:  Controlled   Airway Patency:  Patent  Two or more mitigation strategies used for obstructive sleep apnea   Post Op Vitals Reviewed: Yes    No anethesia notable event occurred.    Staff: CRNA           Last Filed PACU Vitals:  Vitals Value Taken Time   Temp 96.4 °F (35.8 °C) 12/30/24 0851   Pulse 62 12/30/24 0851   BP 97/63 12/30/24 0851   Resp 20 12/30/24 0851   SpO2 97 % 12/30/24 0851       Modified Pelon:  Activity: 2 (12/30/2024  8:52 AM)  Respiration: 2 (12/30/2024  8:52 AM)  Circulation: 2 (12/30/2024  8:52 AM)  Consciousness: 1 (12/30/2024  8:52 AM)  Oxygen Saturation: 2 (12/30/2024  8:52 AM)  Modified Pelon Score: 9 (12/30/2024  8:52 AM)